# Patient Record
Sex: FEMALE | Race: WHITE | NOT HISPANIC OR LATINO | Employment: FULL TIME | URBAN - METROPOLITAN AREA
[De-identification: names, ages, dates, MRNs, and addresses within clinical notes are randomized per-mention and may not be internally consistent; named-entity substitution may affect disease eponyms.]

---

## 2021-09-28 ENCOUNTER — APPOINTMENT (OUTPATIENT)
Dept: GENERAL RADIOLOGY | Facility: CLINIC | Age: 56
End: 2021-09-28
Attending: FAMILY MEDICINE
Payer: COMMERCIAL

## 2021-09-28 ENCOUNTER — APPOINTMENT (OUTPATIENT)
Dept: CT IMAGING | Facility: CLINIC | Age: 56
End: 2021-09-28
Attending: FAMILY MEDICINE
Payer: COMMERCIAL

## 2021-09-28 ENCOUNTER — HOSPITAL ENCOUNTER (OUTPATIENT)
Facility: CLINIC | Age: 56
Setting detail: OBSERVATION
Discharge: HOME | End: 2021-09-29
Attending: FAMILY MEDICINE | Admitting: INTERNAL MEDICINE
Payer: COMMERCIAL

## 2021-09-28 DIAGNOSIS — D50.9 IRON DEFICIENCY ANEMIA, UNSPECIFIED IRON DEFICIENCY ANEMIA TYPE: ICD-10-CM

## 2021-09-28 DIAGNOSIS — D64.9 SEVERE ANEMIA: ICD-10-CM

## 2021-09-28 DIAGNOSIS — R60.0 EDEMA OF BOTH LEGS: ICD-10-CM

## 2021-09-28 DIAGNOSIS — Z11.52 ENCOUNTER FOR SCREENING LABORATORY TESTING FOR SEVERE ACUTE RESPIRATORY SYNDROME CORONAVIRUS 2 (SARS-COV-2): ICD-10-CM

## 2021-09-28 DIAGNOSIS — E87.6 HYPOKALEMIA: ICD-10-CM

## 2021-09-28 LAB
ABO/RH(D): NORMAL
ALBUMIN SERPL-MCNC: 2.7 G/DL (ref 3.4–5)
ALBUMIN SERPL-MCNC: NORMAL G/DL
ALBUMIN UR-MCNC: NEGATIVE MG/DL
ALP SERPL-CCNC: 70 U/L (ref 40–150)
ALP SERPL-CCNC: NORMAL U/L
ALT SERPL W P-5'-P-CCNC: 13 U/L (ref 0–50)
ALT SERPL W P-5'-P-CCNC: NORMAL U/L
ANION GAP SERPL CALCULATED.3IONS-SCNC: 6 MMOL/L (ref 3–14)
ANION GAP SERPL CALCULATED.3IONS-SCNC: NORMAL MMOL/L
ANTIBODY SCREEN: NEGATIVE
APPEARANCE UR: ABNORMAL
AST SERPL W P-5'-P-CCNC: 18 U/L (ref 0–45)
AST SERPL W P-5'-P-CCNC: NORMAL U/L
BACTERIA #/AREA URNS HPF: ABNORMAL /HPF
BASOPHILS # BLD AUTO: 0.1 10E3/UL (ref 0–0.2)
BASOPHILS # BLD MANUAL: 0.1 10E3/UL (ref 0–0.2)
BASOPHILS NFR BLD AUTO: 2 %
BASOPHILS NFR BLD MANUAL: 4 %
BILIRUB SERPL-MCNC: 0.4 MG/DL (ref 0.2–1.3)
BILIRUB SERPL-MCNC: NORMAL MG/DL
BILIRUB UR QL STRIP: NEGATIVE
BLD PROD TYP BPU: NORMAL
BLOOD COMPONENT TYPE: NORMAL
BUN SERPL-MCNC: 6 MG/DL (ref 7–30)
BUN SERPL-MCNC: NORMAL MG/DL
CALCIUM SERPL-MCNC: 7.7 MG/DL (ref 8.5–10.1)
CALCIUM SERPL-MCNC: NORMAL MG/DL
CHLORIDE BLD-SCNC: 115 MMOL/L (ref 94–109)
CHLORIDE BLD-SCNC: NORMAL MMOL/L
CO2 SERPL-SCNC: 21 MMOL/L (ref 20–32)
CO2 SERPL-SCNC: NORMAL MMOL/L
CODING SYSTEM: NORMAL
COLOR UR AUTO: YELLOW
CREAT SERPL-MCNC: 0.71 MG/DL (ref 0.52–1.04)
CREAT SERPL-MCNC: NORMAL MG/DL
CROSSMATCH: NORMAL
DACRYOCYTES BLD QL SMEAR: SLIGHT
ELLIPTOCYTES BLD QL SMEAR: SLIGHT
EOSINOPHIL # BLD AUTO: 0.1 10E3/UL (ref 0–0.7)
EOSINOPHIL # BLD MANUAL: 0.1 10E3/UL (ref 0–0.7)
EOSINOPHIL NFR BLD AUTO: 2 %
EOSINOPHIL NFR BLD MANUAL: 3 %
ERYTHROCYTE [DISTWIDTH] IN BLOOD BY AUTOMATED COUNT: 24.1 % (ref 10–15)
ERYTHROCYTE [DISTWIDTH] IN BLOOD BY AUTOMATED COUNT: 32.6 % (ref 10–15)
FERRITIN SERPL-MCNC: 1 NG/ML (ref 8–252)
FOLATE SERPL-MCNC: 41.6 NG/ML
GFR SERPL CREATININE-BSD FRML MDRD: >90 ML/MIN/1.73M2
GFR SERPL CREATININE-BSD FRML MDRD: NORMAL ML/MIN/{1.73_M2}
GLUCOSE BLD-MCNC: 86 MG/DL (ref 70–99)
GLUCOSE BLD-MCNC: NORMAL MG/DL
GLUCOSE UR STRIP-MCNC: NEGATIVE MG/DL
HCT VFR BLD AUTO: 11.9 % (ref 35–47)
HCT VFR BLD AUTO: 17 % (ref 35–47)
HEMOCCULT STL QL: NEGATIVE
HGB BLD-MCNC: 2.6 G/DL (ref 11.7–15.7)
HGB BLD-MCNC: 4.4 G/DL (ref 11.7–15.7)
HGB UR QL STRIP: NEGATIVE
HOLD SPECIMEN: NORMAL
IMM GRANULOCYTES # BLD: 0.1 10E3/UL
IMM GRANULOCYTES NFR BLD: 1 %
IRON SATN MFR SERPL: 3 % (ref 15–46)
IRON SERPL-MCNC: 13 UG/DL (ref 35–180)
ISSUE DATE AND TIME: NORMAL
KETONES UR STRIP-MCNC: NEGATIVE MG/DL
LDH SERPL L TO P-CCNC: 165 U/L (ref 81–234)
LEUKOCYTE ESTERASE UR QL STRIP: NEGATIVE
LYMPHOCYTES # BLD AUTO: 1.2 10E3/UL (ref 0.8–5.3)
LYMPHOCYTES # BLD MANUAL: 1 10E3/UL (ref 0.8–5.3)
LYMPHOCYTES NFR BLD AUTO: 30 %
LYMPHOCYTES NFR BLD MANUAL: 30 %
MCH RBC QN AUTO: 12.1 PG (ref 26.5–33)
MCH RBC QN AUTO: 16.4 PG (ref 26.5–33)
MCHC RBC AUTO-ENTMCNC: 21.8 G/DL (ref 31.5–36.5)
MCHC RBC AUTO-ENTMCNC: 25.9 G/DL (ref 31.5–36.5)
MCV RBC AUTO: 56 FL (ref 78–100)
MCV RBC AUTO: 63 FL (ref 78–100)
METAMYELOCYTES # BLD MANUAL: 0.1 10E3/UL
METAMYELOCYTES NFR BLD MANUAL: 3 %
MONOCYTES # BLD AUTO: 0.6 10E3/UL (ref 0–1.3)
MONOCYTES # BLD MANUAL: 0.1 10E3/UL (ref 0–1.3)
MONOCYTES NFR BLD AUTO: 15 %
MONOCYTES NFR BLD MANUAL: 4 %
MUCOUS THREADS #/AREA URNS LPF: PRESENT /LPF
MYELOCYTES # BLD MANUAL: 0 10E3/UL
MYELOCYTES NFR BLD MANUAL: 1 %
NEUTROPHILS # BLD AUTO: 1.9 10E3/UL (ref 1.6–8.3)
NEUTROPHILS # BLD MANUAL: 1.9 10E3/UL (ref 1.6–8.3)
NEUTROPHILS NFR BLD AUTO: 50 %
NEUTROPHILS NFR BLD MANUAL: 55 %
NITRATE UR QL: POSITIVE
NRBC # BLD AUTO: 0 10E3/UL
NRBC # BLD AUTO: 0.1 10E3/UL
NRBC BLD AUTO-RTO: 2 /100
NRBC BLD MANUAL-RTO: 1 %
NT-PROBNP SERPL-MCNC: 2364 PG/ML (ref 0–900)
PATH REPORT.COMMENTS IMP SPEC: NORMAL
PATH REPORT.COMMENTS IMP SPEC: NORMAL
PATH REPORT.FINAL DX SPEC: NORMAL
PATH REPORT.MICROSCOPIC SPEC OTHER STN: NORMAL
PATH REPORT.MICROSCOPIC SPEC OTHER STN: NORMAL
PATH REV: ABNORMAL
PH UR STRIP: 5 [PH] (ref 5–7)
PLAT MORPH BLD: ABNORMAL
PLATELET # BLD AUTO: 391 10E3/UL (ref 150–450)
PLATELET # BLD AUTO: 392 10E3/UL (ref 150–450)
POTASSIUM BLD-SCNC: 3.3 MMOL/L (ref 3.4–5.3)
POTASSIUM BLD-SCNC: NORMAL MMOL/L
PROT SERPL-MCNC: 5.7 G/DL (ref 6.8–8.8)
PROT SERPL-MCNC: NORMAL G/DL
RBC # BLD AUTO: 2.14 10E6/UL (ref 3.8–5.2)
RBC # BLD AUTO: 2.69 10E6/UL (ref 3.8–5.2)
RBC MORPH BLD: ABNORMAL
RBC URINE: 1 /HPF
RETICS # AUTO: 0.06 10E6/UL (ref 0.03–0.1)
RETICS/RBC NFR AUTO: 2.6 % (ref 0.5–2)
SARS-COV-2 RNA RESP QL NAA+PROBE: NEGATIVE
SODIUM SERPL-SCNC: 142 MMOL/L (ref 133–144)
SODIUM SERPL-SCNC: NORMAL MMOL/L
SP GR UR STRIP: 1.01 (ref 1–1.03)
SPECIMEN EXPIRATION DATE: NORMAL
SQUAMOUS EPITHELIAL: 3 /HPF
TIBC SERPL-MCNC: 452 UG/DL (ref 240–430)
TROPONIN I SERPL-MCNC: <0.015 UG/L (ref 0–0.04)
TSH SERPL DL<=0.005 MIU/L-ACNC: 2.28 MU/L (ref 0.4–4)
UNIT ABO/RH: NORMAL
UNIT NUMBER: NORMAL
UNIT STATUS: NORMAL
UNIT TYPE ISBT: 5100
UROBILINOGEN UR STRIP-MCNC: NORMAL MG/DL
WBC # BLD AUTO: 3.4 10E3/UL (ref 4–11)
WBC # BLD AUTO: 3.9 10E3/UL (ref 4–11)
WBC URINE: 4 /HPF

## 2021-09-28 PROCEDURE — 250N000011 HC RX IP 250 OP 636: Performed by: INTERNAL MEDICINE

## 2021-09-28 PROCEDURE — 93010 ELECTROCARDIOGRAM REPORT: CPT | Performed by: FAMILY MEDICINE

## 2021-09-28 PROCEDURE — 99285 EMERGENCY DEPT VISIT HI MDM: CPT | Mod: 25 | Performed by: FAMILY MEDICINE

## 2021-09-28 PROCEDURE — 96375 TX/PRO/DX INJ NEW DRUG ADDON: CPT | Mod: 59

## 2021-09-28 PROCEDURE — 82607 VITAMIN B-12: CPT | Performed by: FAMILY MEDICINE

## 2021-09-28 PROCEDURE — 250N000011 HC RX IP 250 OP 636: Performed by: FAMILY MEDICINE

## 2021-09-28 PROCEDURE — 36415 COLL VENOUS BLD VENIPUNCTURE: CPT | Mod: TC | Performed by: FAMILY MEDICINE

## 2021-09-28 PROCEDURE — 85045 AUTOMATED RETICULOCYTE COUNT: CPT | Performed by: FAMILY MEDICINE

## 2021-09-28 PROCEDURE — 250N000009 HC RX 250: Performed by: FAMILY MEDICINE

## 2021-09-28 PROCEDURE — C9803 HOPD COVID-19 SPEC COLLECT: HCPCS | Performed by: FAMILY MEDICINE

## 2021-09-28 PROCEDURE — 250N000013 HC RX MED GY IP 250 OP 250 PS 637: Performed by: INTERNAL MEDICINE

## 2021-09-28 PROCEDURE — 93005 ELECTROCARDIOGRAM TRACING: CPT | Performed by: FAMILY MEDICINE

## 2021-09-28 PROCEDURE — 82728 ASSAY OF FERRITIN: CPT | Performed by: FAMILY MEDICINE

## 2021-09-28 PROCEDURE — 83880 ASSAY OF NATRIURETIC PEPTIDE: CPT | Performed by: FAMILY MEDICINE

## 2021-09-28 PROCEDURE — P9016 RBC LEUKOCYTES REDUCED: HCPCS | Performed by: FAMILY MEDICINE

## 2021-09-28 PROCEDURE — 84484 ASSAY OF TROPONIN QUANT: CPT | Performed by: FAMILY MEDICINE

## 2021-09-28 PROCEDURE — 83550 IRON BINDING TEST: CPT | Performed by: FAMILY MEDICINE

## 2021-09-28 PROCEDURE — 81001 URINALYSIS AUTO W/SCOPE: CPT | Performed by: FAMILY MEDICINE

## 2021-09-28 PROCEDURE — 86901 BLOOD TYPING SEROLOGIC RH(D): CPT | Performed by: FAMILY MEDICINE

## 2021-09-28 PROCEDURE — 36415 COLL VENOUS BLD VENIPUNCTURE: CPT | Performed by: FAMILY MEDICINE

## 2021-09-28 PROCEDURE — 36415 COLL VENOUS BLD VENIPUNCTURE: CPT | Performed by: INTERNAL MEDICINE

## 2021-09-28 PROCEDURE — 96374 THER/PROPH/DIAG INJ IV PUSH: CPT | Mod: 59

## 2021-09-28 PROCEDURE — 87086 URINE CULTURE/COLONY COUNT: CPT | Performed by: FAMILY MEDICINE

## 2021-09-28 PROCEDURE — 86923 COMPATIBILITY TEST ELECTRIC: CPT | Performed by: INTERNAL MEDICINE

## 2021-09-28 PROCEDURE — 74177 CT ABD & PELVIS W/CONTRAST: CPT

## 2021-09-28 PROCEDURE — 82272 OCCULT BLD FECES 1-3 TESTS: CPT | Performed by: FAMILY MEDICINE

## 2021-09-28 PROCEDURE — 82746 ASSAY OF FOLIC ACID SERUM: CPT | Performed by: FAMILY MEDICINE

## 2021-09-28 PROCEDURE — 87635 SARS-COV-2 COVID-19 AMP PRB: CPT | Performed by: FAMILY MEDICINE

## 2021-09-28 PROCEDURE — 120N000001 HC R&B MED SURG/OB

## 2021-09-28 PROCEDURE — 82040 ASSAY OF SERUM ALBUMIN: CPT | Performed by: FAMILY MEDICINE

## 2021-09-28 PROCEDURE — C9113 INJ PANTOPRAZOLE SODIUM, VIA: HCPCS | Performed by: INTERNAL MEDICINE

## 2021-09-28 PROCEDURE — 36430 TRANSFUSION BLD/BLD COMPNT: CPT | Performed by: FAMILY MEDICINE

## 2021-09-28 PROCEDURE — 83615 LACTATE (LD) (LDH) ENZYME: CPT | Performed by: FAMILY MEDICINE

## 2021-09-28 PROCEDURE — 85027 COMPLETE CBC AUTOMATED: CPT | Performed by: INTERNAL MEDICINE

## 2021-09-28 PROCEDURE — 85025 COMPLETE CBC W/AUTO DIFF WBC: CPT | Performed by: FAMILY MEDICINE

## 2021-09-28 PROCEDURE — 85060 BLOOD SMEAR INTERPRETATION: CPT | Performed by: PATHOLOGY

## 2021-09-28 PROCEDURE — 84443 ASSAY THYROID STIM HORMONE: CPT | Performed by: FAMILY MEDICINE

## 2021-09-28 PROCEDURE — 99223 1ST HOSP IP/OBS HIGH 75: CPT | Mod: AI | Performed by: INTERNAL MEDICINE

## 2021-09-28 PROCEDURE — 71046 X-RAY EXAM CHEST 2 VIEWS: CPT

## 2021-09-28 PROCEDURE — 86923 COMPATIBILITY TEST ELECTRIC: CPT | Performed by: FAMILY MEDICINE

## 2021-09-28 RX ORDER — PROCHLORPERAZINE MALEATE 5 MG
10 TABLET ORAL EVERY 6 HOURS PRN
Status: DISCONTINUED | OUTPATIENT
Start: 2021-09-28 | End: 2021-09-29 | Stop reason: HOSPADM

## 2021-09-28 RX ORDER — POTASSIUM CHLORIDE 1.5 G/1.58G
20 POWDER, FOR SOLUTION ORAL ONCE
Status: COMPLETED | OUTPATIENT
Start: 2021-09-28 | End: 2021-09-28

## 2021-09-28 RX ORDER — ONDANSETRON 4 MG/1
4 TABLET, ORALLY DISINTEGRATING ORAL EVERY 6 HOURS PRN
Status: DISCONTINUED | OUTPATIENT
Start: 2021-09-28 | End: 2021-09-29 | Stop reason: HOSPADM

## 2021-09-28 RX ORDER — IOPAMIDOL 755 MG/ML
51 INJECTION, SOLUTION INTRAVASCULAR ONCE
Status: DISCONTINUED | OUTPATIENT
Start: 2021-09-28 | End: 2021-09-29 | Stop reason: HOSPADM

## 2021-09-28 RX ORDER — ONDANSETRON 2 MG/ML
4 INJECTION INTRAMUSCULAR; INTRAVENOUS EVERY 6 HOURS PRN
Status: DISCONTINUED | OUTPATIENT
Start: 2021-09-28 | End: 2021-09-29 | Stop reason: HOSPADM

## 2021-09-28 RX ORDER — AMOXICILLIN 250 MG
2 CAPSULE ORAL 2 TIMES DAILY PRN
Status: DISCONTINUED | OUTPATIENT
Start: 2021-09-28 | End: 2021-09-29 | Stop reason: HOSPADM

## 2021-09-28 RX ORDER — LIDOCAINE 40 MG/G
CREAM TOPICAL
Status: DISCONTINUED | OUTPATIENT
Start: 2021-09-28 | End: 2021-09-29 | Stop reason: HOSPADM

## 2021-09-28 RX ORDER — FUROSEMIDE 10 MG/ML
20 INJECTION INTRAMUSCULAR; INTRAVENOUS EVERY 8 HOURS
Status: DISCONTINUED | OUTPATIENT
Start: 2021-09-28 | End: 2021-09-29

## 2021-09-28 RX ORDER — ACETAMINOPHEN 650 MG/1
650 SUPPOSITORY RECTAL EVERY 6 HOURS PRN
Status: DISCONTINUED | OUTPATIENT
Start: 2021-09-28 | End: 2021-09-29 | Stop reason: HOSPADM

## 2021-09-28 RX ORDER — AMOXICILLIN 250 MG
1 CAPSULE ORAL 2 TIMES DAILY PRN
Status: DISCONTINUED | OUTPATIENT
Start: 2021-09-28 | End: 2021-09-29 | Stop reason: HOSPADM

## 2021-09-28 RX ORDER — ACETAMINOPHEN 325 MG/1
650 TABLET ORAL EVERY 6 HOURS PRN
Status: DISCONTINUED | OUTPATIENT
Start: 2021-09-28 | End: 2021-09-29 | Stop reason: HOSPADM

## 2021-09-28 RX ORDER — PROCHLORPERAZINE 25 MG
25 SUPPOSITORY, RECTAL RECTAL EVERY 12 HOURS PRN
Status: DISCONTINUED | OUTPATIENT
Start: 2021-09-28 | End: 2021-09-29 | Stop reason: HOSPADM

## 2021-09-28 RX ORDER — BISACODYL 10 MG
10 SUPPOSITORY, RECTAL RECTAL DAILY PRN
Status: DISCONTINUED | OUTPATIENT
Start: 2021-09-28 | End: 2021-09-29 | Stop reason: HOSPADM

## 2021-09-28 RX ORDER — IOPAMIDOL 755 MG/ML
52 INJECTION, SOLUTION INTRAVASCULAR ONCE
Status: COMPLETED | OUTPATIENT
Start: 2021-09-28 | End: 2021-09-28

## 2021-09-28 RX ORDER — DIPHENHYDRAMINE HCL 25 MG
25 TABLET ORAL EVERY MORNING
COMMUNITY

## 2021-09-28 RX ORDER — POLYETHYLENE GLYCOL 3350 17 G/17G
17 POWDER, FOR SOLUTION ORAL DAILY PRN
Status: DISCONTINUED | OUTPATIENT
Start: 2021-09-28 | End: 2021-09-29 | Stop reason: HOSPADM

## 2021-09-28 RX ADMIN — SODIUM CHLORIDE 53 ML: 9 INJECTION, SOLUTION INTRAVENOUS at 17:38

## 2021-09-28 RX ADMIN — POTASSIUM CHLORIDE 20 MEQ: 1.5 FOR SOLUTION ORAL at 21:36

## 2021-09-28 RX ADMIN — FUROSEMIDE 20 MG: 10 INJECTION, SOLUTION INTRAMUSCULAR; INTRAVENOUS at 21:36

## 2021-09-28 RX ADMIN — IOPAMIDOL 52 ML: 755 INJECTION, SOLUTION INTRAVENOUS at 17:38

## 2021-09-28 RX ADMIN — PANTOPRAZOLE SODIUM 40 MG: 40 INJECTION, POWDER, FOR SOLUTION INTRAVENOUS at 21:36

## 2021-09-28 ASSESSMENT — ACTIVITIES OF DAILY LIVING (ADL)
DRESSING/BATHING_DIFFICULTY: NO
HEARING_DIFFICULTY_OR_DEAF: NO
DOING_ERRANDS_INDEPENDENTLY_DIFFICULTY: NO
CONCENTRATING,_REMEMBERING_OR_MAKING_DECISIONS_DIFFICULTY: NO
TOILETING_ISSUES: NO
WALKING_OR_CLIMBING_STAIRS_DIFFICULTY: NO
WEAR_GLASSES_OR_BLIND: NO
FALL_HISTORY_WITHIN_LAST_SIX_MONTHS: NO
DIFFICULTY_COMMUNICATING: NO
DIFFICULTY_EATING/SWALLOWING: NO

## 2021-09-28 ASSESSMENT — ENCOUNTER SYMPTOMS
DIARRHEA: 0
DYSURIA: 0
DIAPHORESIS: 0
FEVER: 0
WHEEZING: 0
VOMITING: 0
PALPITATIONS: 0
HEADACHES: 0
NAUSEA: 0
BLOOD IN STOOL: 0
FREQUENCY: 0
SINUS PRESSURE: 0
CHILLS: 0
CONSTIPATION: 1
COUGH: 0
SORE THROAT: 0
ABDOMINAL PAIN: 0
SHORTNESS OF BREATH: 1

## 2021-09-28 ASSESSMENT — MIFFLIN-ST. JEOR
SCORE: 1065.88
SCORE: 1062.16

## 2021-09-28 NOTE — ED PROVIDER NOTES
"  History     Chief Complaint   Patient presents with     Leg Swelling     BLE edema x 2 weeks.  \"all the way up to my crotch\".  pt has not seen a medical provider \"in 30 years\"     HPI  Ayse Velez is a 56 year old female who presents as a stranger to medical care for the last 30 years, history of possible gout in the past, uses tobacco approximately half a pack over the course of a few days of tobacco, 1 beer a day.  Prior increased use about 3 months ago but that is since then decreased.  Accompanied by her daughter.  They have been concerned about increased symmetric bilateral lower extremity edema that is increased from the thighs into the lower legs.  He has not yet been seen for this.  She has tried a over-the-counter diuretic but is unclear what that ones.  She had no prescriptions for any medications.  She has no known liver or renal disease.  She has no history of known thyroid disease.  No known cardiopulmonary disease.  She does have a sense of fatigue on exertion possibly dyspnea on exertion.  She does have to stop and rest as she walks but unclear on distance.  No obvious orthopnea.  No chest pain.  No rashes.  She has constipation chronically.  She stools a large amount approximately once every 1 to 2 weeks but in between puts out small stools.  No significant abdominal pain or distention.      Allergies:  Allergies   Allergen Reactions     Erythromycin Nausea and Vomiting       Problem List:    There are no problems to display for this patient.       Past Medical History:    No past medical history on file.    Past Surgical History:    Past Surgical History:   Procedure Laterality Date     SURGICAL HISTORY OF -   3/19/1981    Transverse Low Cervical   (breech)       Family History:    No family history on file.    Social History:  Marital Status:   [2]  Social History     Tobacco Use     Smoking status: Not on file   Substance Use Topics     Alcohol use: Not on file     Drug use: " "Not on file        Medications:    No current outpatient medications on file.        Review of Systems   Constitutional: Negative for chills, diaphoresis and fever.   HENT: Negative for ear pain, sinus pressure and sore throat.    Eyes: Negative for visual disturbance.   Respiratory: Positive for shortness of breath. Negative for cough and wheezing.    Cardiovascular: Positive for leg swelling. Negative for chest pain and palpitations.   Gastrointestinal: Positive for constipation. Negative for abdominal pain, blood in stool, diarrhea, nausea and vomiting.   Genitourinary: Negative for dysuria, frequency and urgency.   Skin: Negative for rash.   Neurological: Negative for headaches.   All other systems reviewed and are negative.      Physical Exam   BP: 126/63  Pulse: 105  Temp: 98.6  F (37  C)  Resp: 18  Height: 165.1 cm (5' 5\")  Weight: 47.6 kg (105 lb)  SpO2: 100 %      Physical Exam  Constitutional:       General: She is in acute distress.   HENT:      Head: Atraumatic.   Eyes:      Conjunctiva/sclera: Conjunctivae normal.   Cardiovascular:      Rate and Rhythm: Normal rate and regular rhythm.      Heart sounds: No murmur heard.     Pulmonary:      Effort: Pulmonary effort is normal. No respiratory distress.      Breath sounds: Normal breath sounds. No stridor. No wheezing or rhonchi.   Abdominal:      General: Abdomen is flat. There is no distension.      Palpations: There is no mass.      Tenderness: There is no abdominal tenderness. There is no guarding.   Musculoskeletal:      Cervical back: Neck supple.      Right lower leg: Edema present.      Left lower leg: Edema present.   Skin:     Coloration: Skin is pale.      Findings: No rash.   Neurological:      General: No focal deficit present.      Mental Status: She is alert.      Motor: No weakness.     pitting lower extremity edema   She did not let me press significantly due to discomfort with palpation      ED Course        Procedures                EKG " Interpretation:      Interpreted by Kunal Long MD  EKG done at 1325 hrs. demonstrates a sinus rhythm 106 bpm normal axis.  There is no ST change.  There is poor R progression V1 through V3.  No Q waves.  Normal intervals.  Normal conduction.  No ectopy.  Impression sinus rhythm 106 bpm.  There may be the very slightest ST depression in lead V4 and V5.  It is very minimal.  No old EKG to compare.    Critical Care time:  none               Results for orders placed or performed during the hospital encounter of 09/28/21 (from the past 24 hour(s))   UA with Microscopic reflex to Culture    Specimen: Urine, Clean Catch   Result Value Ref Range    Color Urine Yellow Colorless, Straw, Light Yellow, Yellow    Appearance Urine Slightly Cloudy (A) Clear    Glucose Urine Negative Negative mg/dL    Bilirubin Urine Negative Negative    Ketones Urine Negative Negative mg/dL    Specific Gravity Urine 1.011 1.003 - 1.035    Blood Urine Negative Negative    pH Urine 5.0 5.0 - 7.0    Protein Albumin Urine Negative Negative mg/dL    Urobilinogen Urine Normal Normal, 2.0 mg/dL    Nitrite Urine Positive (A) Negative    Leukocyte Esterase Urine Negative Negative    Bacteria Urine Few (A) None Seen /HPF    Mucus Urine Present (A) None Seen /LPF    RBC Urine 1 <=2 /HPF    WBC Urine 4 <=5 /HPF    Squamous Epithelials Urine 3 (H) <=1 /HPF    Narrative    Urine Culture ordered based on laboratory criteria   Comprehensive metabolic panel   Result Value Ref Range    Sodium      Potassium      Chloride      Carbon Dioxide (CO2)      Anion Gap      Urea Nitrogen      Creatinine      Calcium      Glucose      Alkaline Phosphatase      AST      ALT      Protein Total      Albumin      Bilirubin Total      GFR Estimate     [place] Draw    Narrative    The following orders were created for panel order [place] Draw.  Procedure                               Abnormality         Status                     ---------                                -----------         ------                     Extra Green Top (Lithium...[[id number]]                      Final result                 Please view results for these tests on the individual orders.   Extra Green Top (Lithium Heparin) Tube   Result Value Ref Range    Hold Specimen JIC    XR Chest 2 Views    Narrative    XR CHEST 2 VW  9/28/2021 3:15 PM       INDICATION: leg edema  COMPARISON: None       Impression    IMPRESSION: Cardiomegaly. Pulmonary vascularity is within normal  limits. There are small bilateral pleural effusions. Mild infiltrate  or atelectasis in the right lower lobe.    JAYLEN DENNEY MD         SYSTEM ID:  [id number]   CBC with platelets differential    Narrative    The following orders were created for panel order CBC with platelets differential.  Procedure                               Abnormality         Status                     ---------                               -----------         ------                     CBC with platelets and d...[[id number]]  Abnormal            Final result               Manual Differential[[id number]]          Abnormal            Final result                 Please view results for these tests on the individual orders.   TSH with free T4 reflex   Result Value Ref Range    TSH 2.28 0.40 - 4.00 mU/L   Nt probnp inpatient (BNP)   Result Value Ref Range    N terminal Pro BNP Inpatient 2,364 (H) 0 - 900 pg/mL   Troponin I   Result Value Ref Range    Troponin I <0.015 0.000 - 0.045 ug/L   CBC with platelets and differential   Result Value Ref Range    WBC Count 3.4 (L) 4.0 - 11.0 10e3/uL    RBC Count 2.14 (L) 3.80 - 5.20 10e6/uL    Hemoglobin 2.6 (LL) 11.7 - 15.7 g/dL    Hematocrit 11.9 (L) 35.0 - 47.0 %    MCV 56 (L) 78 - 100 fL    MCH 12.1 (L) 26.5 - 33.0 pg    MCHC 21.8 (L) 31.5 - 36.5 g/dL    RDW 24.1 (H) 10.0 - 15.0 %    Platelet Count 391 150 - 450 10e3/uL   ABO/Rh type and screen    Narrative    The following orders were created for panel order ABO/Rh type and  screen.  Procedure                               Abnormality         Status                     ---------                               -----------         ------                     Adult Type and Screen[[id number]]                            Final result                 Please view results for these tests on the individual orders.   Adult Type and Screen   Result Value Ref Range    ABO/RH(D) O POS     Antibody Screen Negative Negative    SPECIMEN EXPIRATION DATE [id number]    Comprehensive metabolic panel   Result Value Ref Range    Sodium 142 133 - 144 mmol/L    Potassium 3.3 (L) 3.4 - 5.3 mmol/L    Chloride 115 (H) 94 - 109 mmol/L    Carbon Dioxide (CO2) 21 20 - 32 mmol/L    Anion Gap 6 3 - 14 mmol/L    Urea Nitrogen 6 (L) 7 - 30 mg/dL    Creatinine 0.71 0.52 - 1.04 mg/dL    Calcium 7.7 (L) 8.5 - 10.1 mg/dL    Glucose 86 70 - 99 mg/dL    Alkaline Phosphatase 70 40 - 150 U/L    AST 18 0 - 45 U/L    ALT 13 0 - 50 U/L    Protein Total 5.7 (L) 6.8 - 8.8 g/dL    Albumin 2.7 (L) 3.4 - 5.0 g/dL    Bilirubin Total 0.4 0.2 - 1.3 mg/dL    GFR Estimate >90 >60 mL/min/1.73m2   Ferritin   Result Value Ref Range    Ferritin 1 (L) 8 - 252 ng/mL   Iron and iron binding capacity   Result Value Ref Range    Iron 13 (L) 35 - 180 ug/dL    Iron Binding Capacity 452 (H) 240 - 430 ug/dL    Iron Sat Index 3 (L) 15 - 46 %   Manual Differential   Result Value Ref Range    % Neutrophils 55 %    % Lymphocytes 30 %    % Monocytes 4 %    % Eosinophils 3 %    % Basophils 4 %    % Metamyelocytes 3 %    % Myelocytes 1 %    NRBCs per 100 WBC 1 (H) <=0 %    Absolute Neutrophils 1.9 1.6 - 8.3 10e3/uL    Absolute Lymphocytes 1.0 0.8 - 5.3 10e3/uL    Absolute Monocytes 0.1 0.0 - 1.3 10e3/uL    Absolute Eosinophils 0.1 0.0 - 0.7 10e3/uL    Absolute Basophils 0.1 0.0 - 0.2 10e3/uL    Absolute Metamyelocytes 0.1 (H) <=0.0 10e3/uL    Absolute Myelocytes 0.0 <=0.0 10e3/uL    Absolute NRBCs 0.0 <=0.0 10e3/uL    RBC Morphology Confirmed RBC Indices      Platelet Assessment  Automated Count Confirmed. Platelet morphology is normal.     Automated Count Confirmed. Platelet morphology is normal.    Elliptocytes Slight (A) None Seen    Teardrop Cells Slight (A) None Seen    Pathologist Review Comments      Narrative    Sent for Review by Pathologist. Review comments will be entered. Results will be updated after review as applicable.     Lactate Dehydrogenase   Result Value Ref Range    Lactate Dehydrogenase 165 81 - 234 U/L   Lab Blood Morphology Pathologist Review    Narrative    The following orders were created for panel order Lab Blood Morphology Pathologist Review.  Procedure                               Abnormality         Status                     ---------                               -----------         ------                     Bld morphology pathology...[[id number]]                      Final result               CBC with platelets and d...[[id number]]                                                 Reticulocyte count[[id number]]                                                          Morphology Tracking[[id number]]                              Final result                 Please view results for these tests on the individual orders.   Reticulocyte count   Result Value Ref Range    % Reticulocyte 2.6 (H) 0.5 - 2.0 %    Absolute Reticulocyte 0.055 0.025 - 0.095 10e6/uL   Bld morphology pathology review   Result Value Ref Range    Final Diagnosis       A.  Peripheral blood smear for morphology:  -  Marked microcytic, hypochromic anemia without morphologic evidence of hemolysis or increased red cell regeneration.  -  Mild leukopenia without an absolute decrease of any specific cell type      Comment       The findings are highly suspect for iron deficiency related anemia.  Full iron studies are recommended and if a deficiency of iron is confirmed additional clinical correlation should follow to determine if the deficiency is related to increased iron loss from  occult bleeding, small bowel disease with decreased iron absorption, or decreased dietary iron consumption.        Peripheral Smear       PERIPHERAL BLOOD DIFFERENTIAL:    The automated differential associated with the CBC for this case was imported from [facility] and was confirmed accurate with a 200 cell count manual differential review of the smear prepared from a blood sample collected 9/28/2021 at 1551.    PERIPHERAL BLOOD MORPHOLOGY:    ERYTHROCYTES: The red cells are microcytic hypochromic and markedly decreased in number for the patient's age and gender with a resulting mild anemia.  Moderate anisopoikilocytosis is present consisting of increased ovalocytes and elliptocytes in addition to the microcytic cell population. No features of hemolysis or increased polychromasia are identified.  No parasites are seen.    LEUKOCYTES:  The leukocytes are morphologically normal and mildly decreased in number. No immature precursors or evidence of neutrophilic dysplasia is seen. No atypical lymphoid cells are seen. No parasites or parasitic inclusions are seen.           PLATELETS:  The platelets are normal in number and morphology.      Peripheral Hematologic Data       Results for MADLEINE MARS (MRN [id number])    Ref. Range 9/28/2021 15:51   WBC Latest Ref Range: 4.0 - 11.0 10e3/uL 3.4 (L)   Hemoglobin Latest Ref Range: 11.7 - 15.7 g/dL 2.6 (LL)   Hematocrit Latest Ref Range: 35.0 - 47.0 % 11.9 (L)   Platelet Count Latest Ref Range: 150 - 450 10e3/uL 391   RBC Count Latest Ref Range: 3.80 - 5.20 10e6/uL 2.14 (L)   MCV Latest Ref Range: 78 - 100 fL 56 (L)   MCH Latest Ref Range: 26.5 - 33.0 pg 12.1 (L)   MCHC Latest Ref Range: 31.5 - 36.5 g/dL 21.8 (L)   RDW Latest Ref Range: 10.0 - 15.0 % 24.1 (H)   % Neutrophils Latest Units: % 55   % Lymphocytes Latest Units: % 30   % Monocytes Latest Units: % 4   % Eosinophils Latest Units: % 3   Absolute Basophils Latest Ref Range: 0.0 - 0.2 10e3/uL 0.1   % Basophils Latest Units:  % 4   % Metamyelocytes Latest Units: % 3   % Myelocytes Latest Units: % 1   NRBC/W Latest Ref Range: <=0 % 1 (H)   Absolute Neutrophil Latest Ref Range: 1.6 - 8.3 10e3/uL 1.9   Absolute Lymphocytes Latest Ref Range: 0.8 - 5.3 10e3/uL 1.0   Absolute Monocytes Latest Ref Range: 0.0 - 1.3 10e3/uL 0.1   Absolute Eosinophils Latest Ref Range: 0.0 - 0.7 10e3/uL 0.1   Absolute Metamyelocytes Latest Ref Range: <=0.0 10e3/uL 0.1 (H)   Absolute Myelocytes Latest Ref Range: <=0.0 10e3/uL 0.0   Absolute NRBCs Latest Ref Range: <=0.0 10e3/uL 0.0   RBC Morphology Unknown Confirmed RBC Indices   Platelet Morphology Latest Ref Range: Automated Count Confirmed. Platelet morphology is normal.  Automated Count Confirmed. Platelet morphology is normal.   Teardrop Cells Latest Ref Range: None Seen  Slight (A)   Elliptocytes Latest Ref Range: None Seen  Slight (A)   % Retic Latest Ref Range: 0.5 - 2.0 % 2.6 (H)   Absolute Retic Latest Ref Range: 0.025 - 0.095 10e6/uL 0.055         Performing Labs       The technical component of this testing was completed at [facility], [facility] and [facility]     Occult blood stool   Result Value Ref Range    Occult Blood Negative Negative   Prepare red blood cells (unit)   Result Value Ref Range    CROSSMATCH Compatible     UNIT ABO/RH O Pos     Unit Number [id number]     Unit Status Issued     Blood Component Type Red Blood Cells     Product Code Q2283W00     CODING SYSTEM [id number]     UNIT TYPE ISBT 5100     ISSUE DATE AND TIME [id number]    Prepare red blood cells (unit)   Result Value Ref Range    CROSSMATCH Compatible     UNIT ABO/RH O Pos     Unit Number [id number]     Unit Status Issued     Blood Component Type Red Blood Cells     Product Code S5752N74     CODING SYSTEM [id number]     UNIT TYPE ISBT 5100     ISSUE DATE AND TIME [id number]    Prepare red blood cells (unit)   Result Value Ref  Range    CROSSMATCH Compatible     UNIT ABO/RH O Pos     Unit Number [id number]     Unit Status Ready     Blood Component Type Red Blood Cells     Product Code [id number]     CODING SYSTEM [id number]     UNIT TYPE ISBT 5100    Prepare red blood cells (unit)   Result Value Ref Range    CROSSMATCH Compatible     UNIT ABO/RH O Pos     Unit Number [id number]     Unit Status Ready     Blood Component Type Red Blood Cells     Product Code [id number]     CODING SYSTEM [id number]     UNIT TYPE ISBT 5100    Asymptomatic COVID-19 Virus (Coronavirus) by PCR Nasopharyngeal    Specimen: Nasopharyngeal; Swab   Result Value Ref Range    SARS CoV2 PCR Negative Negative    Narrative    Testing was performed using the manuel  SARS-CoV-2 & Influenza A/B Assay on the manuel  Suzie  System.  This test should be ordered for the detection of SARS-COV-2 in individuals who meet SARS-CoV-2 clinical and/or epidemiological criteria. Test performance is unknown in asymptomatic patients.  This test is for in vitro diagnostic use under the FDA EUA for laboratories certified under CLIA to perform moderate and/or high complexity testing. This test has not been FDA cleared or approved.  A negative test does not rule out the presence of PCR inhibitors in the specimen or target RNA in concentration below the limit of detection for the assay. The possibility of a false negative should be considered if the patient's recent exposure or clinical presentation suggests COVID-19.  [facility] are certified under the Clinical Laboratory Improvement Amendments of 1988 (CLIA-88) as qualified to perform moderate and/or high complexity laboratory testing.   CT Chest/Abdomen/Pelvis w Contrast    Narrative    EXAM: CT CHEST/ABDOMEN/PELVIS W CONTRAST  LOCATION: [facility]  DATE/TIME: 9/28/2021 5:26 PM    INDICATION: Severe anemia. Bilateral lower extremity edema. Suspected underlying malignancy.  COMPARISON: None.  TECHNIQUE:  CT scan of the chest, abdomen, and pelvis was performed following injection of IV contrast. Multiplanar reformats were obtained. Dose reduction techniques were used.   CONTRAST: 52 mL Isovue 370    FINDINGS:   LUNGS AND PLEURA: There are bilateral pleural effusions, small to moderate on the right and small on the left. Mild associated atelectasis at both lung bases. Smooth interlobular septal thickening in both lower lungs suggests pulmonary edema. There are   mild emphysematous changes noted in both upper lungs.    MEDIASTINUM/AXILLAE: Borderline enlarged precarinal lymph node in the mediastinum measures 1.8 x 1 cm. No other enlarged lymph nodes are identified in the chest. No pericardial effusion. Cardiac enlargement.    CORONARY ARTERY CALCIFICATION: Mild.    HEPATOBILIARY: Normal.    PANCREAS: Normal.    SPLEEN: Normal.    ADRENAL GLANDS: Normal.    KIDNEYS/BLADDER: No significant mass, stone, or hydronephrosis.    BOWEL: Diverticulosis of the colon. No acute inflammatory change. No obstruction.     LYMPH NODES: No lymphadenopathy.    VASCULATURE: Mild atherosclerotic aortoiliac calcification.    PELVIC ORGANS: Small amount of free fluid in the pelvis.    MUSCULOSKELETAL: Mild subcutaneous edema about the abdomen and pelvis.      Impression    IMPRESSION:  1.  Bilateral pleural effusions, small to moderate on the right and small on the left.  2.  Smooth interlobular septal thickening in both lower lungs is likely related to pulmonary edema.  3.  Mild subcutaneous edema is noted about the abdomen and pelvis.  4.  Small amount of nonspecific free fluid in the pelvis.  5.  Cardiac enlargement.  6.  Emphysema.   CBC with platelets differential *Canceled*    Narrative    The following orders were created for panel order CBC with platelets differential.  Procedure                               Abnormality         Status                     ---------                               -----------         ------                        Please view results for these tests on the individual orders.   CBC with platelets differential    Narrative    The following orders were created for panel order CBC with platelets differential.  Procedure                               Abnormality         Status                     ---------                               -----------         ------                     CBC with platelets and d...[[id number]]  Abnormal            Final result                 Please view results for these tests on the individual orders.   CBC with platelets and differential   Result Value Ref Range    WBC Count 3.9 (L) 4.0 - 11.0 10e3/uL    RBC Count 2.69 (L) 3.80 - 5.20 10e6/uL    Hemoglobin 4.4 (LL) 11.7 - 15.7 g/dL    Hematocrit 17.0 (L) 35.0 - 47.0 %    MCV 63 (L) 78 - 100 fL    MCH 16.4 (L) 26.5 - 33.0 pg    MCHC 25.9 (L) 31.5 - 36.5 g/dL    RDW 32.6 (H) 10.0 - 15.0 %    Platelet Count 392 150 - 450 10e3/uL    % Neutrophils 50 %    % Lymphocytes 30 %    % Monocytes 15 %    % Eosinophils 2 %    % Basophils 2 %    % Immature Granulocytes 1 %    NRBCs per 100 WBC 2 (H) <1 /100    Absolute Neutrophils 1.9 1.6 - 8.3 10e3/uL    Absolute Lymphocytes 1.2 0.8 - 5.3 10e3/uL    Absolute Monocytes 0.6 0.0 - 1.3 10e3/uL    Absolute Eosinophils 0.1 0.0 - 0.7 10e3/uL    Absolute Basophils 0.1 0.0 - 0.2 10e3/uL    Absolute Immature Granulocytes 0.1 (H) <=0.0 10e3/uL    Absolute NRBCs 0.1 10e3/uL       Medications - No data to display    Assessments & Plan (with Medical Decision Making)     MDM:  Ayse Velez is a 56 year old female who presents with lower extremity edema bilaterally pallor, dyspnea, stranger to medical care.  Has not had any of her preventive care.  She has never had a colonoscopy, she has no history of mammograms breast evaluations Pap smear.  No immunizations.  Recommended that we start with CBC comprehensive panel BNP chest x-ray urinalysis thyroid testing to evaluate for bilateral symmetric lower extremity edema.   We discussed that we are likely to uncover some abnormalities and she will need follow-up.  We will plan for primary care referral.  We discussed potential risk that there may be some significant abnormality such as renal dysfunction or cardiomyopathy  that may require hospitalization.,,  And she is very reluctant to do so at this point      The patient's labs including hemoglobin were significantly delayed due to confirmation.  When they were confirmed the hemoglobin was between 2 and 3.  This is remarkable as the patient's systolic blood pressure was in 130s and heart rate was in the 60s to 80 range.  Although pale she remained hemodynamically stable and relatively asymptomatic at rest.  We did discuss consent for blood products.  Discussed the risk and benefits.  Very slow transfusion was initiated with the first unit given over approximately 3 hours.  Intended continue slow transfusions.  There is a risk for fluid overload for her and she does have findings of congestive heart failure with cardiomegaly pleural effusions which I suspect is due to an osmotic gradient with no blood little protein resulting in third spacing of fluid.   I suspect this anemia has been very longstanding with the way she is compensated.     I did pursue evaluation for secondary causes after discussion with Leon Esteban MD regarding obtaining CT of the abdomen pelvis as well as chest for evaluation of occult malignancy.  This imaging is relatively reassuring in this regard.  Blood is also been sent for ferritin and TIBC.  Indices were not initially known and therefore B12 was also obtained as well as folic acid.  Blood was sent for LDH as well as for peripheral smear reticulocyte count    Her rectal exam reveals no significant material out.  No obvious blood loss that I could identify.    I discussed at length with the patient and her daughter related to findings.  She agrees to be admitted to the hospital.  I have reviewed the nursing  notes.      I have reviewed the findings, diagnosis, plan and need for follow up with the patient.       ED to Inpatient Handoff:    Discussed with Dr. Esteban  Patient accepted for Inpatient Stay  Pending studies include CT chest/abd/pelvis  Code Status: Not Addressed           New Prescriptions    No medications on file       Final diagnoses:   Severe anemia   Edema of both legs   Hypokalemia   Iron deficiency anemia, unspecified iron deficiency anemia type       9/28/2021   [facility] EMERGENCY DEPT     Kunal Long MD  09/28/21 [id number]

## 2021-09-28 NOTE — LETTER
9/29/2021     RE:  Ayse Velez                                                                                                                                                       [address] [place] [id number]            To whom it may concern:    Ayse Velez has been hospitalized under my professional care from 9/28/2021 to 9/29/2021. I have recommended that she not return to work until Monday, Oct 4, 2021, at which time she may return to work without restriction.       Sincerely,        Mahin Perez MD   [facility]

## 2021-09-29 ENCOUNTER — APPOINTMENT (OUTPATIENT)
Dept: CARDIOLOGY | Facility: CLINIC | Age: 56
End: 2021-09-29
Attending: INTERNAL MEDICINE
Payer: COMMERCIAL

## 2021-09-29 VITALS
TEMPERATURE: 98.7 F | WEIGHT: 105.82 LBS | SYSTOLIC BLOOD PRESSURE: 132 MMHG | RESPIRATION RATE: 16 BRPM | BODY MASS INDEX: 17.63 KG/M2 | HEIGHT: 65 IN | HEART RATE: 93 BPM | OXYGEN SATURATION: 99 % | DIASTOLIC BLOOD PRESSURE: 58 MMHG

## 2021-09-29 PROBLEM — R60.0 EDEMA OF BOTH LEGS: Status: ACTIVE | Noted: 2021-09-29

## 2021-09-29 PROBLEM — E87.6 HYPOKALEMIA: Status: ACTIVE | Noted: 2021-09-29

## 2021-09-29 PROBLEM — Z11.52 ENCOUNTER FOR SCREENING LABORATORY TESTING FOR SEVERE ACUTE RESPIRATORY SYNDROME CORONAVIRUS 2 (SARS-COV-2): Status: ACTIVE | Noted: 2021-09-29

## 2021-09-29 PROBLEM — D50.9 IRON DEFICIENCY ANEMIA, UNSPECIFIED IRON DEFICIENCY ANEMIA TYPE: Status: ACTIVE | Noted: 2021-09-29

## 2021-09-29 LAB
ANION GAP SERPL CALCULATED.3IONS-SCNC: 4 MMOL/L (ref 3–14)
BLD PROD TYP BPU: NORMAL
BLOOD COMPONENT TYPE: NORMAL
BUN SERPL-MCNC: 6 MG/DL (ref 7–30)
CALCIUM SERPL-MCNC: 7.8 MG/DL (ref 8.5–10.1)
CHLORIDE BLD-SCNC: 109 MMOL/L (ref 94–109)
CO2 SERPL-SCNC: 25 MMOL/L (ref 20–32)
CODING SYSTEM: NORMAL
CREAT SERPL-MCNC: 0.74 MG/DL (ref 0.52–1.04)
CROSSMATCH: NORMAL
ERYTHROCYTE [DISTWIDTH] IN BLOOD BY AUTOMATED COUNT: ABNORMAL %
GFR SERPL CREATININE-BSD FRML MDRD: 90 ML/MIN/1.73M2
GLUCOSE BLD-MCNC: 82 MG/DL (ref 70–99)
HCT VFR BLD AUTO: 27.9 % (ref 35–47)
HGB BLD-MCNC: 6.3 G/DL (ref 11.7–15.7)
HGB BLD-MCNC: 8.2 G/DL (ref 11.7–15.7)
HGB BLD-MCNC: 8.2 G/DL (ref 11.7–15.7)
LVEF ECHO: NORMAL
MCH RBC QN AUTO: 20.1 PG (ref 26.5–33)
MCHC RBC AUTO-ENTMCNC: 29.4 G/DL (ref 31.5–36.5)
MCV RBC AUTO: 68 FL (ref 78–100)
PLATELET # BLD AUTO: 354 10E3/UL (ref 150–450)
POTASSIUM BLD-SCNC: 3.7 MMOL/L (ref 3.4–5.3)
POTASSIUM BLD-SCNC: 3.7 MMOL/L (ref 3.4–5.3)
RBC # BLD AUTO: 4.08 10E6/UL (ref 3.8–5.2)
SODIUM SERPL-SCNC: 138 MMOL/L (ref 133–144)
UNIT ABO/RH: NORMAL
UNIT NUMBER: NORMAL
UNIT STATUS: NORMAL
UNIT TYPE ISBT: 5100
VIT B12 SERPL-MCNC: 413 PG/ML (ref 193–986)
WBC # BLD AUTO: 4.2 10E3/UL (ref 4–11)

## 2021-09-29 PROCEDURE — 99217 PR OBSERVATION CARE DISCHARGE: CPT | Performed by: INTERNAL MEDICINE

## 2021-09-29 PROCEDURE — P9016 RBC LEUKOCYTES REDUCED: HCPCS | Performed by: FAMILY MEDICINE

## 2021-09-29 PROCEDURE — 36415 COLL VENOUS BLD VENIPUNCTURE: CPT | Performed by: FAMILY MEDICINE

## 2021-09-29 PROCEDURE — 85018 HEMOGLOBIN: CPT | Performed by: INTERNAL MEDICINE

## 2021-09-29 PROCEDURE — 80048 BASIC METABOLIC PNL TOTAL CA: CPT | Performed by: INTERNAL MEDICINE

## 2021-09-29 PROCEDURE — 84132 ASSAY OF SERUM POTASSIUM: CPT | Performed by: INTERNAL MEDICINE

## 2021-09-29 PROCEDURE — 96376 TX/PRO/DX INJ SAME DRUG ADON: CPT | Mod: 59

## 2021-09-29 PROCEDURE — 93306 TTE W/DOPPLER COMPLETE: CPT

## 2021-09-29 PROCEDURE — 85018 HEMOGLOBIN: CPT | Performed by: FAMILY MEDICINE

## 2021-09-29 PROCEDURE — G0378 HOSPITAL OBSERVATION PER HR: HCPCS

## 2021-09-29 PROCEDURE — 250N000011 HC RX IP 250 OP 636: Performed by: INTERNAL MEDICINE

## 2021-09-29 PROCEDURE — 93306 TTE W/DOPPLER COMPLETE: CPT | Mod: 26 | Performed by: INTERNAL MEDICINE

## 2021-09-29 PROCEDURE — 36415 COLL VENOUS BLD VENIPUNCTURE: CPT | Performed by: INTERNAL MEDICINE

## 2021-09-29 PROCEDURE — C9113 INJ PANTOPRAZOLE SODIUM, VIA: HCPCS | Performed by: INTERNAL MEDICINE

## 2021-09-29 RX ORDER — FERROUS SULFATE 7.5 MG/0.5
5 SYRINGE (EA) ORAL DAILY
Qty: 500 ML | Refills: 0 | Status: SHIPPED | OUTPATIENT
Start: 2021-09-29

## 2021-09-29 RX ADMIN — PANTOPRAZOLE SODIUM 40 MG: 40 INJECTION, POWDER, FOR SOLUTION INTRAVENOUS at 07:53

## 2021-09-29 RX ADMIN — FUROSEMIDE 20 MG: 10 INJECTION, SOLUTION INTRAMUSCULAR; INTRAVENOUS at 06:05

## 2021-09-29 ASSESSMENT — ACTIVITIES OF DAILY LIVING (ADL)
ADLS_ACUITY_SCORE: 3

## 2021-09-29 NOTE — H&P
"[facility]    History and Physical - Hospitalist Service       Date of Admission:  9/28/2021    Assessment & Plan   Ayse Velez is a 56 year old female admitted on 9/28/2021. She is admitted with severe anemia.    Severe anemia  Patient presented emergency department with a hemoglobin of 2.6.  Four units have been ordered by the emergency room physician to be transfused.  Recheck hemoglobin following transfusion.  MCV is low at 56.  Ferritin and iron studies are pending.  B12 and folate levels also pending.  CT of the chest abdomen and pelvis with contrast demonstrates no lymphadenopathy or evidence of malignancy.  LDH is normal at 165.  Peripheral blood smear and reticulocyte count is pending.  We will consult general surgery for EGD and colonoscopy.    Cardiac enlargement  Cardiac enlargement is noted on chest CT.  Troponin is undetectable but BNP is elevated at 2364.  Will order echocardiogram.    Bilateral lower extremity edema  Likely due to a combination of CHF and low oncotic pressure from severe anemia.  We will try to gently diurese with IV Lasix 20mg every 8 hours.    Nicotine abuse  Patient's been advised to quit smoking.     Diet: Regular Diet Adult    DVT Prophylaxis: Pneumatic Compression Devices  Ledezma Catheter: Not present  Code Status:   Full    Disposition Plan   Patient be discharged to home once hemoglobin stable.    Entered: Leon Esteban MD 09/28/2021, 7:41 PM       Leon Esteban MD  [facility]    ______________________________________________________________________    Chief Complaint   Chief Complaint   Patient presents with     Leg Swelling     BLE edema x 2 weeks.  \"all the way up to my crotch\".  pt has not seen a medical provider \"in 30 years\"        History is obtained from the patient    History of Present Illness   Ayse Velez is a 56 year old female who presents to the emergency department after several weeks " of lower extremity edema.  Patient states that the edema has gradually worsened.  She denies orthopnea or paroxysmal nocturnal dyspnea.  She denies chest pain, pressure, or palpitations.  She denies lightheadedness or dizziness.  She denies aspirin or NSAID use.  She denies family history of anemia or malignancy.  She denies hematuria or tea colored urine.  She denies diarrhea, melena, or hematochezia.  She denies vomiting, hematemesis, or coffee-ground emesis.  The patient denies headache, nasal congestion, runny nose, sore throat, wheezing, cough, or shortness of breath.   Patient denies syncope, falls, or trauma.  Patient denies rash, muscle aches, joint pain.  Patient denies headache, neck pain, or back pain.  Patient denies diplopia, dysarthria, dysphagia, incoordination, focal numbness, or unilateral weakness.  The patient states that she has not seen a doctor in over 30 years and is not up-to-date on any of her recommended cancer screening.    Review of Systems    The 10 point Review of Systems is negative other than noted in the HPI or here.     Past Medical History    I have reviewed this patient's medical history and updated it with pertinent information if needed.   No past medical history on file.    Patient Active Problem List    Diagnosis Date Noted     Severe anemia 2021     Priority: Medium        Past Surgical History   I have reviewed this patient's surgical history and updated it with pertinent information if needed.  Past Surgical History:   Procedure Laterality Date     SURGICAL HISTORY OF -   3/19/1981    Transverse Low Cervical   (breech)       Social History   I have reviewed this patient's social history and updated it with pertinent information if needed.  Social History     Tobacco Use     Smoking status: Not on file   Substance Use Topics     Alcohol use: Not on file     Drug use: Not on file       Family History   Negative for malignancy  Father with pacemaker    No family  "history on file.    Prior to Admission Medications   Prior to Admission Medications   Prescriptions Last Dose Informant Patient Reported? Taking?   diphenhydrAMINE (BENADRYL) 25 MG tablet 9/27/2021 at am Self Yes Yes   Sig: Take 25 mg by mouth every morning      Facility-Administered Medications: None     Allergies   Allergies   Allergen Reactions     Erythromycin Nausea and Vomiting       Physical Exam   Vital Signs: Temp: 98.9  F (37.2  C) Temp src: Oral BP: 120/50 Pulse: 91   Resp: 16 SpO2: 100 % O2 Device: None (Room air)    Weight: 105 lbs 0 oz    Gen: Pale thin female, resting comfortably in bed, no acute distress  Head: atraumatic normocephalic; sclera non-injected, anicterric; pt declines oral exam due to \"bad teeth\"  Neck: supple without spinal abnormality  Chest: clear to auscultation bilaterally, no wheezes, no rhonchi, no rales.  Cardiovascular: regular rate and rhythm, no gallops or rubs, 2/6 WESLY, 3+ BLE edema to mid thigh  Abdomen: bowel sounds normal, no hepatosplenomegaly, no masses, non-tender, non-distended, no guarding, no rebound tenderness  Musculoskeletal: normal muscle mass, normal muscle tone  Skin: no rashes, no chronic venous stasis  Lymph: no lymphadenopathy.  Neuro: cranial nerves II-XII intact, strength in all four extremities normal, reflexes normal, coordination normal.    Data   Data reviewed today: I reviewed all medications, new labs and imaging results over the last 24 hours. I personally reviewed no images or EKG's today.    Recent Labs   Lab 09/28/21  1551   WBC 3.4*   HGB 2.6*   MCV 56*         POTASSIUM 3.3*   CHLORIDE 115*   CO2 21   BUN 6*   CR 0.71   ANIONGAP 6   MIGEL 7.7*   GLC 86   ALBUMIN 2.7*   PROTTOTAL 5.7*   BILITOTAL 0.4   ALKPHOS 70   ALT 13   AST 18   TROPONIN <0.015         Recent Results (from the past 24 hour(s))   XR Chest 2 Views    Narrative    XR CHEST 2 VW  9/28/2021 3:15 PM       INDICATION: leg edema  COMPARISON: None       Impression    " IMPRESSION: Cardiomegaly. Pulmonary vascularity is within normal  limits. There are small bilateral pleural effusions. Mild infiltrate  or atelectasis in the right lower lobe.    JAYLEN DENNEY MD         SYSTEM ID:  [id number]   CT Chest/Abdomen/Pelvis w Contrast    Narrative    EXAM: CT CHEST/ABDOMEN/PELVIS W CONTRAST  LOCATION: [facility]  DATE/TIME: 9/28/2021 5:26 PM    INDICATION: Severe anemia. Bilateral lower extremity edema. Suspected underlying malignancy.  COMPARISON: None.  TECHNIQUE: CT scan of the chest, abdomen, and pelvis was performed following injection of IV contrast. Multiplanar reformats were obtained. Dose reduction techniques were used.   CONTRAST: 52 mL Isovue 370    FINDINGS:   LUNGS AND PLEURA: There are bilateral pleural effusions, small to moderate on the right and small on the left. Mild associated atelectasis at both lung bases. Smooth interlobular septal thickening in both lower lungs suggests pulmonary edema. There are   mild emphysematous changes noted in both upper lungs.    MEDIASTINUM/AXILLAE: Borderline enlarged precarinal lymph node in the mediastinum measures 1.8 x 1 cm. No other enlarged lymph nodes are identified in the chest. No pericardial effusion. Cardiac enlargement.    CORONARY ARTERY CALCIFICATION: Mild.    HEPATOBILIARY: Normal.    PANCREAS: Normal.    SPLEEN: Normal.    ADRENAL GLANDS: Normal.    KIDNEYS/BLADDER: No significant mass, stone, or hydronephrosis.    BOWEL: Diverticulosis of the colon. No acute inflammatory change. No obstruction.     LYMPH NODES: No lymphadenopathy.    VASCULATURE: Mild atherosclerotic aortoiliac calcification.    PELVIC ORGANS: Small amount of free fluid in the pelvis.    MUSCULOSKELETAL: Mild subcutaneous edema about the abdomen and pelvis.      Impression    IMPRESSION:  1.  Bilateral pleural effusions, small to moderate on the right and small on the left.  2.  Smooth interlobular septal thickening in both  lower lungs is likely related to pulmonary edema.  3.  Mild subcutaneous edema is noted about the abdomen and pelvis.  4.  Small amount of nonspecific free fluid in the pelvis.  5.  Cardiac enlargement.  6.  Emphysema.

## 2021-09-29 NOTE — PROVIDER NOTIFICATION
Dr. Esteban at approximately 2200 w/ concern from pt's hemoglobin rising from 2.7 to 4.4 after 1 unit.     Dr Esteban advised to Transfuse an additional 2 units of blood and reassess hemoglobin following the 3rd unit administered.

## 2021-09-29 NOTE — PROGRESS NOTES
"[facility] [place]    Patient admitted to room 2306 at approximately 2045 via wheel chair from emergency room. Patient was accompanied by transport tech.     Verbal SBAR report received from ED RN prior to patient arrival.     Patient ambulated to bed with stand-by assist. Patient alert and oriented X 3. The patient is not having any pain.  . Admission vital signs: Blood pressure 126/55, pulse 94, temperature 98.6  F (37  C), temperature source Oral, resp. rate 17, height 1.651 m (5' 5\"), weight 48 kg (105 lb 13.1 oz), SpO2 100 %. Patient was oriented to plan of care, call light, bed controls, tv, telephone, bathroom and visiting hours.     Risk Assessment    The following safety risks were identified during admission: fall. Yellow risk band applied: YES.     Skin Initial Assessment    This writer admitted this patient and completed a full skin assessment and Toño score in the Adult PCS flowsheet. Appropriate interventions initiated as needed.     Secondary skin check completed by Lashay Avalos RN.         Education    Patient has a [place] to Observation order: No  Observation education completed and documented: No      Poppy Painter RN    "

## 2021-09-29 NOTE — PLAN OF CARE
Vitals stable; denies pain. Alert and oriented. Tolerating regular diet. Up independently without any dizziness ore lightheadedness. Hemoglobin stable. Echo completed today. Patient eager for discharge. Plan of care reviewed with patient.

## 2021-09-29 NOTE — PLAN OF CARE
Vitals stable. Denies pain. Reviewed discharge instructions with patient; verbalized understanding and agreement. IV removed. Given doctor's note. Patient waiting on ride.

## 2021-09-29 NOTE — DISCHARGE SUMMARY
[facility]  Discharge Summary  Hospital Medicine       Date of Admission:  9/28/2021  Date of Discharge:  9/29/2021  5:07 PM  Discharging Provider: Mahin Perez MD      Identification and Chief Compaint: Ayse Velez is a 56 year old female who presented on 9/28/2021 with complaint of increasing lower extremity edema.    Discharge Diagnoses   Severe iron-deficiency anemia  Gastroesophageal reflux disease   Edema due to severe anemia  Moderate diastolic dysfunction without heart failure   Tobacco use disorder     Follow-ups Needed After Discharge   Follow-up Appointments     Follow-up and recommended labs and tests       Follow up with primary care provider, [facility] within 7   days for hospital follow- up.  The following labs/tests are recommended:   Hemoglobin check when you see your PCP.  Upper and lower endoscopies.          Follow up blood pressure    Hospital Course        Ayse Velez is a 56 year old female admitted on 9/28/2021. She is admitted with severe anemia.    Severe iron-deficiency anemia  Gastroesophageal reflux disease     Patient presented emergency department with a hemoglobin of 2.6.  Four units have been ordered by the emergency room physician to be transfused.  Recheck hemoglobin following transfusion.  MCV is low at 56.  Ferritin is 1, iron 13, TIBC 452, iron sat 3%.  B12 and folate both normal.  Blood morphology consistent with iron deficiency anemia. CT of the chest abdomen and pelvis with contrast demonstrates no lymphadenopathy or evidence of malignancy.  LDH is normal at 165. No evidence of active bleeding.    After 4 units PRBC, hemoglobin is 8.2, and patient is feeling better. She wishes to discharge and have outpatient endoscopies, and this is reasonable given no active bleeding. She reports her menses ended 8 or more years ago but they had been very heavy. It's possible she has had iron deficiency for a long time. She does  report intermittent heartburn and reflux but no hematemesis or melena.   - start oral iron once daily, take with acidic juice (patient to use cranberry juice). She wants to use liquid if possible as she has some trouble swallowing pills if too large. Liquid script written but could use pill if needed   - omeprazole 40 mg daily at least until PUD or gastritis ruled out by EGD   - outpatient EGD and colonoscopy in couple of weeks, patient agreeable   - establish PCP for follow up, patient prefers [facility] clinic near her home. Follow hemoglobin and endoscopies. May need further work up if scopes negative.     Bilateral lower extremity edema    Cardiac enlargement is noted on chest CT.  Troponin is undetectable but BNP is elevated at 2364. Possible combination of CHF and low oncotic pressure from severe anemia.  Treated with IV Lasix 20mg every 8 hours and edema is improving.    Echo shows normal left ventricular size and function with ejection fraction 60 to 65%. There is grade 2 or moderate diastolic dysfunction.  There is mild 1+ aortic and tricuspid regurg.  Right ventricle is normal size and function.  IVC diameter is less than 2.1 cm collapsing greater than 50% with sniff suggest normal RA pressure of 3 mmHg.    Edema most likely related to the severe anemia.  Does not appear to have heart failure.  Blood pressure is not particularly elevated but should be followed as an outpatient given the diastolic dysfunction.  Will not continue diuretics on discharge but should follow outpatient this see if this improves as the anemia is improved.    Nicotine abuse  Patient's been advised to quit smoking.    Consultations This Hospital Stay   None    Code Status   Full Code    The discharge plan was discussed with the patient, and she expressed understanding.     Time Spent on this Encounter   Total time on this discharge was 60 minutes over two visits today.       Mahin Perez MD  [facility]  ______________________________________________________________________    Physical Exam   Vital Signs: Temp: 98.7  F (37.1  C) Temp src: Oral BP: 132/58 Pulse: 93   Resp: 16 SpO2: 99 % O2 Device: None (Room air)    Weight: 105 lbs 13.13 oz  Constitutional: alert and oriented, NAD, nontoxic  CV: Regular, 1-2+ bilateral lower extremity edema   Respiratory: CTA bilaterally  GI: Soft, non-tender   Skin: Warm and dry        Primary Care Physician   [facility]  [address] [id number]     Discharge Disposition   Discharged to home  Condition at discharge: Stable    Significant Results and Procedures   Results for orders placed or performed during the hospital encounter of 09/28/21   XR Chest 2 Views    Narrative    XR CHEST 2 VW  9/28/2021 3:15 PM       INDICATION: leg edema  COMPARISON: None       Impression    IMPRESSION: Cardiomegaly. Pulmonary vascularity is within normal  limits. There are small bilateral pleural effusions. Mild infiltrate  or atelectasis in the right lower lobe.    JAYLEN DENNEY MD         SYSTEM ID:  [id number]   CT Chest/Abdomen/Pelvis w Contrast    Narrative    EXAM: CT CHEST/ABDOMEN/PELVIS W CONTRAST  LOCATION: [facility]  DATE/TIME: 9/28/2021 5:26 PM    INDICATION: Severe anemia. Bilateral lower extremity edema. Suspected underlying malignancy.  COMPARISON: None.  TECHNIQUE: CT scan of the chest, abdomen, and pelvis was performed following injection of IV contrast. Multiplanar reformats were obtained. Dose reduction techniques were used.   CONTRAST: 52 mL Isovue 370    FINDINGS:   LUNGS AND PLEURA: There are bilateral pleural effusions, small to moderate on the right and small on the left. Mild associated atelectasis at both lung bases. Smooth interlobular septal thickening in both lower lungs suggests pulmonary edema. There are   mild emphysematous changes noted in both upper lungs.    MEDIASTINUM/AXILLAE: Borderline enlarged  precarinal lymph node in the mediastinum measures 1.8 x 1 cm. No other enlarged lymph nodes are identified in the chest. No pericardial effusion. Cardiac enlargement.    CORONARY ARTERY CALCIFICATION: Mild.    HEPATOBILIARY: Normal.    PANCREAS: Normal.    SPLEEN: Normal.    ADRENAL GLANDS: Normal.    KIDNEYS/BLADDER: No significant mass, stone, or hydronephrosis.    BOWEL: Diverticulosis of the colon. No acute inflammatory change. No obstruction.     LYMPH NODES: No lymphadenopathy.    VASCULATURE: Mild atherosclerotic aortoiliac calcification.    PELVIC ORGANS: Small amount of free fluid in the pelvis.    MUSCULOSKELETAL: Mild subcutaneous edema about the abdomen and pelvis.      Impression    IMPRESSION:  1.  Bilateral pleural effusions, small to moderate on the right and small on the left.  2.  Smooth interlobular septal thickening in both lower lungs is likely related to pulmonary edema.  3.  Mild subcutaneous edema is noted about the abdomen and pelvis.  4.  Small amount of nonspecific free fluid in the pelvis.  5.  Cardiac enlargement.  6.  Emphysema.   Echocardiogram Complete     Value    LVEF  60-65%    [facility]    [id number]  [id number]  [id number]  [id number]^DAYO^JAYLEN^MARIA GUADALUPE     [facility]  Echocardiography Laboratory  [address] [place] [address]     Name: MADELINE MARS  MRN: [id number]  : 1965  Study Date: 2021 10:41 AM  Age: 56 yrs  Gender: Female  Patient Location: [facility]  Reason For Study: CHF  Ordering Physician: JAYLEN OSHEA  Referring Physician: [facility]  Performed By: Tonja Farias RDCS     BSA: 1.5 m2  Height: 65 in  Weight: 105 lb  HR: 89  BP: 132/58 mmHg  ______________________________________________________________________________  Procedure  Complete Portable Echo Adult.  ______________________________________________________________________________  Interpretation Summary     1. Normal biventricular size and function. Left  ventricular ejection fraction  of 60-65%. No segmental wall motion abnormalities noted.  2. Grade II or moderate diastolic dysfunction. Diastolic Doppler findings  (E/E' ratio and/or other parameters) suggest left ventricular filling  pressures are increased.  3. There is mild (1+) aortic and tricuspid regurgitation..  4. Normal pulmonary artery pressure.  No prior study for comparison. Technically adequate study.     ______________________________________________________________________________  Left Ventricle  The left ventricle is normal in size. There is mild concentric left  ventricular hypertrophy. Left ventricular systolic function is normal. The  visual ejection fraction is 60-65%. Grade II or moderate diastolic  dysfunction. Diastolic Doppler findings (E/E' ratio and/or other parameters)  suggest left ventricular filling pressures are increased. Normal left  ventricular wall motion.     Right Ventricle  The right ventricle is normal in size and function.     Atria  The left atrium is borderline dilated. Right atrial size is normal. There is  no color Doppler evidence of an atrial shunt.     Mitral Valve  The mitral valve leaflets appear normal. There is no evidence of stenosis,  fluttering, or prolapse. There is trace mitral regurgitation.     Tricuspid Valve  Normal tricuspid valve. There is mild (1+) tricuspid regurgitation. The right  ventricular systolic pressure is approximated at 30.9 mmHg plus the right  atrial pressure.     Aortic Valve  The aortic valve is trileaflet. There is mild (1+) aortic regurgitation. No  aortic stenosis is present.     Pulmonic Valve  Normal pulmonic valve. There is trace pulmonic valvular regurgitation.     Vessels  The aortic root is normal size. Normal size ascending aorta. IVC diameter <2.1  cm collapsing >50% with sniff suggests a normal RA pressure of 3 mmHg.     Pericardium  Trivial pericardial effusion.     Rhythm  Sinus rhythm was  noted.  ______________________________________________________________________________  MMode/2D Measurements & Calculations  IVSd: 0.94 cm     LVIDd: 5.3 cm  LVIDs: 3.6 cm  LVPWd: 0.97 cm  FS: 33.3 %  LV mass(C)d: 191.3 grams  LV mass(C)dI: 127.2 grams/m2  Ao root diam: 3.2 cm  LA dimension: 3.2 cm  asc Aorta Diam: 3.4 cm  LA/Ao: 1.0  LA Volume (BP): 51.0 ml  LA Volume Index (BP): 34.0 ml/m2  RWT: 0.36     Doppler Measurements & Calculations  MV E max missael: 106.6 cm/sec  MV A max missael: 88.8 cm/sec  MV E/A: 1.2  MV dec time: 0.23 sec  AI P1/2t: 381.8 msec  PA acc time: 0.13 sec  TR max missael: 278.0 cm/sec  TR max P.9 mmHg  E/E' av.9  Lateral E/e': 14.9  Medial E/e': 18.9     ______________________________________________________________________________  Report approved by: Rik Boggs 2021 03:21 PM             Procedures    Transfusion of 4 units PRBC    Discharge Orders      Adult Gastro Ref - Procedure Only      Reason for your hospital stay    Severe iron deficiency anemia. The cause is not yet clear and this will need further work up with upper GI endoscopy and colonoscopy and follow up with a primary care provider.     Follow-up and recommended labs and tests     Follow up with primary care provider, [facility] within 7 days for hospital follow- up.  The following labs/tests are recommended: Hemoglobin check when you see your PCP.  Upper and lower endoscopies.     Activity    Your activity upon discharge: activity as tolerated     Diet    Follow this diet upon discharge:      Regular Diet Adult     Discharge Medications   Discharge Medication List as of 2021  4:43 PM      START taking these medications    Details   ferrous sulfate (XUAN-IN-SOL) 75 (15 FE) MG/ML oral drops Take 16 mLs (240 mg) by mouth daily, Disp-500 mL, R-0, E-PrescribePatient would like liquid but if expensive or not available, may substitute 324 mg tab daily      omeprazole (PRILOSEC) 20 MG DR capsule  Take 2 capsules (40 mg) by mouth daily, Disp-60 capsule, R-0, E-Prescribe         CONTINUE these medications which have NOT CHANGED    Details   diphenhydrAMINE (BENADRYL) 25 MG tablet Take 25 mg by mouth every morning, Historical           Allergies   Allergies   Allergen Reactions     Erythromycin Nausea and Vomiting

## 2021-09-29 NOTE — PROGRESS NOTES
[facility] Medicine Progress Note  Date of Service: 09/29/2021     Assessment & Plan           Ayse Velez is a 56 year old female admitted on 9/28/2021. She is admitted with severe anemia.    Severe iron-deficiency anemia  Gastroesophageal reflux disease     Patient presented emergency department with a hemoglobin of 2.6.  Four units have been ordered by the emergency room physician to be transfused.  Recheck hemoglobin following transfusion.  MCV is low at 56.  Ferritin is 1, iron 13, TIBC 452, iron sat 3%.  B12 and folate both normal.  Blood morphology consistent with iron deficiency anemia. CT of the chest abdomen and pelvis with contrast demonstrates no lymphadenopathy or evidence of malignancy.  LDH is normal at 165. No evidence of active bleeding.    After 4 units PRBC, hemoglobin is 8.2, and patient is feeling better. She wishes to discharge and have outpatient endoscopies, and this is reasonable given no active bleeding. She reports her menses ended 8 or more years ago but they had been very heavy. It's possible she has had iron deficiency for a long time. She does report intermittent heartburn and reflux but no hematemesis or melena.   - start oral iron once daily, take with acidic juice (patient to use cranberry juice). She wants to use liquid if possible as she has some trouble swallowing pills if too large. Liquid script written but could use pill if needed   - omeprazole 40 mg daily at least until PUD or gastritis ruled out by EGD   - outpatient EGD and colonoscopy in couple of weeks, patient agreeable   - establish PCP for follow up, patient prefers [facility] clinic near her home. Follow hemoglobin and endoscopies. May need further work up if scopes negative.     Cardiac enlargement      Bilateral lower extremity edema    Cardiac enlargement is noted on chest CT.  Troponin is undetectable but BNP is elevated at 2364. Possible combination of CHF and low  oncotic pressure from severe anemia.  Treated with IV Lasix 20mg every 8 hours and edema is improving.   - follow up echo today as may need further med management if there is cardiomyopathy    Nicotine abuse  Patient's been advised to quit smoking.       Diet: NPO per Anesthesia Guidelines for Procedure/Surgery Except for: Meds    DVT Prophylaxis: Low Risk/Ambulatory with no VTE prophylaxis indicated  Ledezma Catheter: Not present  Central Lines: None  Code Status: Full Code      Disposition Plan   Expected discharge: likely later today, pending echo result     The patient's care was discussed with the Patient.    Mahin Perez MD  Hospitalist Service  [facility]    Interval History   HPI reviewed with patient. Feels better. Edema improved. No chest pain or shortness of breath, no orthopnea or PND. Some reflux and heartburn at times but no hematemesis, melena or BRBPR. Last had menses over 8 years ago but they were very heavy before they stopped. No healthcare visits 30 years.   Still smokes cigarettes. Has one drink per day, usually beer or rum and coke, no methamphetamine or other illicit drug use.     Data reviewed today: I reviewed all medications, new labs and imaging results over the last 24 hours. I personally reviewed no images or EKG's today.    Physical Exam   Temp:  [98.2  F (36.8  C)-98.9  F (37.2  C)] 98.7  F (37.1  C)  Pulse:  [] 93  Resp:  [13-36] 16  BP: (118-151)/(50-71) 132/58  SpO2:  [99 %-100 %] 99 %  Constitutional: alert and oriented, NAD  CV: Regular, 2+ bilateral lower extremity edema, no JVD, PMI is normal  Respiratory: CTA bilaterally  GI: Soft, thin, non-tender, bowel sounds are normal  Skin: Warm and dry    Data   Recent Labs   Lab 09/29/21  0744 09/29/21  0200 09/28/21  2133 09/28/21  1551 09/28/21  1551   WBC 4.2  --  3.9*  --  3.4*   HGB 8.2*  8.2* 6.3* 4.4*   < > 2.6*   MCV 68*  --  63*  --  56*     --  392  --  391   NA  --   --   --    --  142   POTASSIUM  --  3.7  --   --  3.3*   CHLORIDE  --   --   --   --  115*   CO2  --   --   --   --  21   BUN  --   --   --   --  6*   CR  --   --   --   --  0.71   ANIONGAP  --   --   --   --  6   MIGEL  --   --   --   --  7.7*   GLC  --   --   --   --  86   ALBUMIN  --   --   --   --  2.7*   PROTTOTAL  --   --   --   --  5.7*   BILITOTAL  --   --   --   --  0.4   ALKPHOS  --   --   --   --  70   ALT  --   --   --   --  13   AST  --   --   --   --  18   TROPONIN  --   --   --   --  <0.015    < > = values in this interval not displayed.     Recent Results (from the past 24 hour(s))   XR Chest 2 Views    Narrative    XR CHEST 2 VW  9/28/2021 3:15 PM       INDICATION: leg edema  COMPARISON: None       Impression    IMPRESSION: Cardiomegaly. Pulmonary vascularity is within normal  limits. There are small bilateral pleural effusions. Mild infiltrate  or atelectasis in the right lower lobe.    JAYLEN DENNEY MD         SYSTEM ID:  [id number]   CT Chest/Abdomen/Pelvis w Contrast    Narrative    EXAM: CT CHEST/ABDOMEN/PELVIS W CONTRAST  LOCATION: [facility]  DATE/TIME: 9/28/2021 5:26 PM    INDICATION: Severe anemia. Bilateral lower extremity edema. Suspected underlying malignancy.  COMPARISON: None.  TECHNIQUE: CT scan of the chest, abdomen, and pelvis was performed following injection of IV contrast. Multiplanar reformats were obtained. Dose reduction techniques were used.   CONTRAST: 52 mL Isovue 370    FINDINGS:   LUNGS AND PLEURA: There are bilateral pleural effusions, small to moderate on the right and small on the left. Mild associated atelectasis at both lung bases. Smooth interlobular septal thickening in both lower lungs suggests pulmonary edema. There are   mild emphysematous changes noted in both upper lungs.    MEDIASTINUM/AXILLAE: Borderline enlarged precarinal lymph node in the mediastinum measures 1.8 x 1 cm. No other enlarged lymph nodes are identified in the chest. No  pericardial effusion. Cardiac enlargement.    CORONARY ARTERY CALCIFICATION: Mild.    HEPATOBILIARY: Normal.    PANCREAS: Normal.    SPLEEN: Normal.    ADRENAL GLANDS: Normal.    KIDNEYS/BLADDER: No significant mass, stone, or hydronephrosis.    BOWEL: Diverticulosis of the colon. No acute inflammatory change. No obstruction.     LYMPH NODES: No lymphadenopathy.    VASCULATURE: Mild atherosclerotic aortoiliac calcification.    PELVIC ORGANS: Small amount of free fluid in the pelvis.    MUSCULOSKELETAL: Mild subcutaneous edema about the abdomen and pelvis.      Impression    IMPRESSION:  1.  Bilateral pleural effusions, small to moderate on the right and small on the left.  2.  Smooth interlobular septal thickening in both lower lungs is likely related to pulmonary edema.  3.  Mild subcutaneous edema is noted about the abdomen and pelvis.  4.  Small amount of nonspecific free fluid in the pelvis.  5.  Cardiac enlargement.  6.  Emphysema.     Medications       furosemide  20 mg Intravenous Q8H     iopamidol (ISOVUE-370)  51 mL Intravenous Once     pantoprazole (PROTONIX) IV  40 mg Intravenous BID AC     sodium chloride (PF)  3 mL Intracatheter Q8H     sodium chloride 0.9 %  53 mL Intravenous Once

## 2021-09-29 NOTE — PLAN OF CARE
A&O X4. SBA. Last bowel movement 9/25. NPO since 0000. IV Lasix administered per MAR, edema decreased from 3+ to 2+. 3 units of blood transfused, awaiting hemoglobin results.

## 2021-09-29 NOTE — UTILIZATION REVIEW
"Concurrent stay review; Secondary Review Determination     Under the authority of the Utilization Management Committee, the utilization review process indicated a secondary review on the above patient.  The review outcome is based on review of the medical records, discussions with staff, and applying clinical experience noted on the date of the review.          (x) Observation Status Appropriate - Concurrent stay review    RATIONALE FOR DETERMINATION   55 yo with severe microcytic anemia, presented with hemoglobin 2.6. No obvious source so likely slow blood loss via GI tract. Has been transfused 4 units PRBCs with hemoglobin now 8.2. Per chart patient is \"eager for discharge\" today and likely to discharge home tonight due to stable hemoglobon. Can have outpatient EGD, colonoscopy.     Patient is clinically improving and there is no clear indication to change patient's status to inpatient. The severity of illness, intensity of service provided, expected LOS and risk for adverse outcome make the care appropriate for observation.    This document was produced using voice recognition software     The information on this document is developed by the utilization review team in order for the business office to ensure compliance.  This only denotes the appropriateness of proper admission status and does not reflect the quality of care rendered.         The definitions of Inpatient Status and Observation Status used in making the determination above are those provided in the CMS Coverage Manual, Chapter 1 and Chapter 6, section 70.4.      Sincerely,   Monserrat Guido MD  Utilization Review  Physician Advisor  [facility].  "

## 2021-09-29 NOTE — PROGRESS NOTES
Pt is A &O x4, has a steady gait and denies having any dizziness, she I now independent in her room. Verbally per Dr. Perez, pt is okay to eat now, she can have an outpatient EGD if needed. Possible discharge today. Per pt and previous RN the edema to the pt's BLE has decreased greatly, edema is now a +1 to +2 to her BLE.

## 2021-09-30 LAB — BACTERIA UR CULT: ABNORMAL

## 2021-10-06 ENCOUNTER — OFFICE VISIT (OUTPATIENT)
Dept: FAMILY MEDICINE | Facility: CLINIC | Age: 56
End: 2021-10-06
Payer: COMMERCIAL

## 2021-10-06 VITALS
OXYGEN SATURATION: 100 % | RESPIRATION RATE: 18 BRPM | BODY MASS INDEX: 16.83 KG/M2 | TEMPERATURE: 98.3 F | HEART RATE: 91 BPM | SYSTOLIC BLOOD PRESSURE: 138 MMHG | HEIGHT: 65 IN | WEIGHT: 101 LBS | DIASTOLIC BLOOD PRESSURE: 74 MMHG

## 2021-10-06 DIAGNOSIS — E87.6 HYPOKALEMIA: ICD-10-CM

## 2021-10-06 DIAGNOSIS — D50.0 IRON DEFICIENCY ANEMIA DUE TO CHRONIC BLOOD LOSS: Primary | ICD-10-CM

## 2021-10-06 DIAGNOSIS — Z13.220 SCREENING CHOLESTEROL LEVEL: ICD-10-CM

## 2021-10-06 DIAGNOSIS — Z87.891 HISTORY OF SMOKING 30 OR MORE PACK YEARS: ICD-10-CM

## 2021-10-06 DIAGNOSIS — Z76.89 ESTABLISHING CARE WITH NEW DOCTOR, ENCOUNTER FOR: ICD-10-CM

## 2021-10-06 DIAGNOSIS — Z91.89 AT RISK FOR OSTEOPOROSIS: ICD-10-CM

## 2021-10-06 DIAGNOSIS — I51.89 DIASTOLIC DYSFUNCTION: ICD-10-CM

## 2021-10-06 DIAGNOSIS — R03.0 ELEVATED BLOOD PRESSURE READING WITHOUT DIAGNOSIS OF HYPERTENSION: ICD-10-CM

## 2021-10-06 LAB
ANION GAP SERPL CALCULATED.3IONS-SCNC: 6 MMOL/L (ref 3–14)
BUN SERPL-MCNC: 17 MG/DL (ref 7–30)
CALCIUM SERPL-MCNC: 8.4 MG/DL (ref 8.5–10.1)
CHLORIDE BLD-SCNC: 108 MMOL/L (ref 94–109)
CHOLEST SERPL-MCNC: 92 MG/DL
CO2 SERPL-SCNC: 25 MMOL/L (ref 20–32)
CREAT SERPL-MCNC: 0.86 MG/DL (ref 0.52–1.04)
FASTING STATUS PATIENT QL REPORTED: NO
GFR SERPL CREATININE-BSD FRML MDRD: 75 ML/MIN/1.73M2
GLUCOSE BLD-MCNC: 89 MG/DL (ref 70–99)
HDLC SERPL-MCNC: 63 MG/DL
HGB BLD-MCNC: 8 G/DL (ref 11.7–15.7)
LDLC SERPL CALC-MCNC: 19 MG/DL
NONHDLC SERPL-MCNC: 29 MG/DL
POTASSIUM BLD-SCNC: 4.9 MMOL/L (ref 3.4–5.3)
SODIUM SERPL-SCNC: 139 MMOL/L (ref 133–144)
TRIGL SERPL-MCNC: 52 MG/DL

## 2021-10-06 PROCEDURE — 80061 LIPID PANEL: CPT | Performed by: FAMILY MEDICINE

## 2021-10-06 PROCEDURE — 99204 OFFICE O/P NEW MOD 45 MIN: CPT | Performed by: FAMILY MEDICINE

## 2021-10-06 PROCEDURE — 36415 COLL VENOUS BLD VENIPUNCTURE: CPT | Performed by: FAMILY MEDICINE

## 2021-10-06 PROCEDURE — 80048 BASIC METABOLIC PNL TOTAL CA: CPT | Performed by: FAMILY MEDICINE

## 2021-10-06 PROCEDURE — 85018 HEMOGLOBIN: CPT | Performed by: FAMILY MEDICINE

## 2021-10-06 ASSESSMENT — MIFFLIN-ST. JEOR: SCORE: 1044.01

## 2021-10-06 NOTE — PROGRESS NOTES
Assessment & Plan     Iron deficiency anemia due to chronic blood loss  - Hemoglobin  - Adult Gastro Ref - Procedure Only  - Hemoglobin    Hypokalemia  - Basic metabolic panel  (Ca, Cl, CO2, Creat, Gluc, K, Na, BUN)  - Basic metabolic panel  (Ca, Cl, CO2, Creat, Gluc, K, Na, BUN)    At risk for osteoporosis  - calcium carbonate-vitamin D (OS-MIGEL) 500-400 MG-UNIT tablet  Dispense: 90 tablet; Refill: 3    Screening cholesterol level  - Lipid panel reflex to direct LDL Non-fasting  - Lipid panel reflex to direct LDL Non-fasting     Diastolic Dysfunction  See pt instructions    Tobacco use disorder > 30 pack years smoking history   reports that she has been smoking cigarettes. She has never used smokeless tobacco.  Precontemplative    Elevated blood pressure reading without diagnosis of hypertension  Unconnected from care, dislikes interacting with medical system. May be white coat htn. Educated pt on multiple elevated Bps in chart and possible dx of HTN  Will just continue to f/u    Declined all her vaccinations today but I asked permission to bring it up again next time    Patient Instructions   - Recheck hemoglobin and basic metabolic profile today.  - I put the orders for the scopes from top and bottom but you don't have to schedule yet, I just want you to have them on hand.  - Your heart wasn't completely filling right (diastolic dysfunction) but mostly working well. We may want to repeat an echocardiogram in a few months.  - IF your blood level is OK today, we should recheck your blood level in 1 months as well as rechecking your iron level.  - IF you blood level is below 7 today I will be calling you and asking you to come back sooner.    Return in about 4 weeks (around 11/3/2021) for Follow up blood level.    Requests phone call for lab results. Both normal and abnormal.  Best to call [phone] - ok to leave detailed VM    Moraima Freedman MD  [facility]   Ayse  "is a 56 year old who presents for the following health issues  accompanied by her self:    HPI   Chief Complaint   Patient presents with     Hospital F/U     Health Maintenance     Patient declined all      Hospital Follow-up Visit:    Hospital/Nursing Home/IP Rehab Facility: [facility]  Date of Admission: 9-28-21  Date of Discharge: 9-29-21  Reason(s) for Admission: severe anemia, legs and feet swollen, patient is still having trouble with her ankles and feet still swollen          Was your hospitalization related to COVID-19? No   Problems taking medications regularly:  None  Medication changes since discharge: ferrous sulfate take 16mls (240mg)by mouth daily, Patient is taking 5 mls    Problems adhering to non-medication therapy:  Blood draw     Summary of hospitalization:  [facility] discharge summary reviewed  Diagnostic Tests/Treatments reviewed.  Follow up needed: blood levels and cardiac issues  Other Healthcare Providers Involved in Patient s Care:         None  Update since discharge: Overall feeling \"way better.\" Mentally feeling clearer. Much improved swelling - more just feet and ankles now whereas before it was up to hips. Reports that \"everyone comments that I'm not white as a ghost\"    Post Discharge Medication Reconciliation: discharge medications reconciled, continue medications without change.  Plan of care communicated with patient        Disconnected from care  No physician visits in decades (\"since my son was born\" - he's 28)  Works in [employer]. Combination of sitting and standing and walking around  Reports bad menopausal bleeding around 8 years ago lasting about a year    Reports around 1.5packs/week  Been smoking around 40 years - used to smoke up to a pack per day.  Has quit smoking in the past and succeeded by \"just doing it\"    Denies any hx of abnormal weight loss - reports she has been very slim her whole life.    Social " "History     Socioeconomic History     Marital status:      Spouse name: None     Number of children: None     Years of education: None     Highest education level: None   Occupational History     None   Tobacco Use     Smoking status: Current Every Day Smoker     Types: Cigarettes     Smokeless tobacco: Never Used   Vaping Use     Vaping Use: Never used   Substance and Sexual Activity     Alcohol use: Yes     Comment: 1 drink daily     Drug use: Never     Sexual activity: Yes     Partners: Male   Other Topics Concern     None   Social History Narrative     None     Review of Systems   Constitutional, HEENT, cardiovascular, pulmonary, gi and gu systems are negative, except as otherwise noted.      Objective    /74   Pulse 91   Temp 98.3  F (36.8  C) (Tympanic)   Resp 18   Ht 1.651 m (5' 5\")   Wt 45.8 kg (101 lb)   SpO2 100%   BMI 16.81 kg/m    Body mass index is 16.81 kg/m .  Physical Exam   GENERAL: appears cachectic but well. alert and no distress  RESP: lungs clear to auscultation - no rales, rhonchi or wheezes  CV: regular rate and rhythm, normal S1 S2, no S3 or S4, no murmur, click or rub, no peripheral edema and peripheral pulses strong  ABDOMEN: soft, nontender, no hepatosplenomegaly, no masses and bowel sounds normal  MS: no gross musculoskeletal defects noted. BLE pitting edema 2+ shelter up shins.            "

## 2021-10-06 NOTE — PATIENT INSTRUCTIONS
- Recheck hemoglobin and basic metabolic profile today.  - I put the orders for the scopes from top and bottom but you don't have to schedule yet, I just want you to have them on hand.  - Your heart wasn't completely filling right (diastolic dysfunction) but mostly working well. We may want to repeat an echocardiogram in a few months.  - IF your blood level is OK today, we should recheck your blood level in 1 months as well as rechecking your iron level.  - IF you blood level is below 7 today I will be calling you and asking you to come back sooner.

## 2021-10-19 ENCOUNTER — TELEPHONE (OUTPATIENT)
Dept: EMERGENCY MEDICINE | Facility: CLINIC | Age: 56
End: 2021-10-19

## 2021-10-19 NOTE — TELEPHONE ENCOUNTER
FYI    Patient has failed to return calls to schedule diagnostic upper and lower endoscopy procedures ordered

## 2021-11-07 ENCOUNTER — HEALTH MAINTENANCE LETTER (OUTPATIENT)
Age: 56
End: 2021-11-07

## 2021-11-12 ENCOUNTER — OFFICE VISIT (OUTPATIENT)
Dept: FAMILY MEDICINE | Facility: CLINIC | Age: 56
End: 2021-11-12
Payer: COMMERCIAL

## 2021-11-12 VITALS
OXYGEN SATURATION: 98 % | HEART RATE: 86 BPM | TEMPERATURE: 98.5 F | WEIGHT: 102.6 LBS | RESPIRATION RATE: 16 BRPM | BODY MASS INDEX: 17.09 KG/M2 | SYSTOLIC BLOOD PRESSURE: 136 MMHG | HEIGHT: 65 IN | DIASTOLIC BLOOD PRESSURE: 66 MMHG

## 2021-11-12 DIAGNOSIS — Z00.00 HEALTHCARE MAINTENANCE: ICD-10-CM

## 2021-11-12 DIAGNOSIS — R60.0 BILATERAL LOWER EXTREMITY EDEMA: ICD-10-CM

## 2021-11-12 DIAGNOSIS — Z28.21 COVID-19 VACCINATION DECLINED: ICD-10-CM

## 2021-11-12 DIAGNOSIS — Z23 NEED FOR VACCINATION: ICD-10-CM

## 2021-11-12 DIAGNOSIS — D50.0 IRON DEFICIENCY ANEMIA DUE TO CHRONIC BLOOD LOSS: Primary | ICD-10-CM

## 2021-11-12 LAB
FERRITIN SERPL-MCNC: 34 NG/ML (ref 8–252)
HGB BLD-MCNC: 10 G/DL (ref 11.7–15.7)

## 2021-11-12 PROCEDURE — 85018 HEMOGLOBIN: CPT | Performed by: FAMILY MEDICINE

## 2021-11-12 PROCEDURE — 99214 OFFICE O/P EST MOD 30 MIN: CPT | Performed by: FAMILY MEDICINE

## 2021-11-12 PROCEDURE — 82728 ASSAY OF FERRITIN: CPT | Performed by: FAMILY MEDICINE

## 2021-11-12 PROCEDURE — 36415 COLL VENOUS BLD VENIPUNCTURE: CPT | Performed by: FAMILY MEDICINE

## 2021-11-12 ASSESSMENT — MIFFLIN-ST. JEOR: SCORE: 1051.27

## 2021-11-12 NOTE — PROGRESS NOTES
"  Assessment & Plan     Iron deficiency anemia due to chronic blood loss  As long as it's improving, would likely be OK to recheck ~3months  We talked about whether a repeat echo in a few months might be indicated, at this point I think unlikely to change anything as long as her anemia improves  - Hemoglobin  - Ferritin  - Ferritin  - Hemoglobin    BLE edema - resolved  Encouraged to continue with improved nutrition    Need for vaccination  Covid vaccine decliend  Health maintenance  Patient declined all of the preventive health recommendations today, I did go through the list with her and she was not wanting to do any of it currently not discuss it so I postponed the recommended items.  I attempted to  on vaccination recommendations more specifically with particular focus on the covid-19 vaccine including harms/benefits and her added risk factors. I attempted to dispel misinformation but pt reports knowing that it's beneficial but not wanting it anyway  - REVIEW OF HEALTH MAINTENANCE PROTOCOL ORDERS    Return if symptoms worsen or fail to improve.    Moraima Freedman MD  [facility]   Ayse is a 56 year old who presents for the following health issues     HPI     Chief Complaint   Patient presents with     RECHECK     here to follow up on edema and recheck labs. edema has resolved.      Edema is gone  Cardiac symptoms have resolved  Declines all of the health maintenance    Tolerating the iron supplements no GI distress    Review of Systems   Constitutional, HEENT, cardiovascular, pulmonary, gi and gu systems are negative, except as otherwise noted.      Objective    /66   Pulse 86   Temp 98.5  F (36.9  C) (Tympanic)   Resp 16   Ht 1.651 m (5' 5\")   Wt 46.5 kg (102 lb 9.6 oz)   SpO2 98%   BMI 17.07 kg/m    Body mass index is 17.07 kg/m .  Physical Exam   GENERAL: healthy, alert and no distress  CV: RRR, normal S1, S2, no M/R/G.  trace edema of " LLE  RESP: normal respiratory effort, speaking in complete sentences  MS: no gross musculoskeletal defects noted  PSYCH: mentation appears normal, affect normal/bright

## 2022-04-13 ENCOUNTER — TELEPHONE (OUTPATIENT)
Dept: FAMILY MEDICINE | Facility: CLINIC | Age: 57
End: 2022-04-13
Payer: COMMERCIAL

## 2022-04-13 NOTE — TELEPHONE ENCOUNTER
Patient Quality Outreach    Patient is due for the following:   Cervical Cancer Screening - PAP Needed  Physical  - due    NEXT STEPS:   Schedule a yearly physical    Type of outreach:    Sent [employer] message.    Next Steps:  Reach out within 90 days via [employer].    Max number of attempts reached: No. Will try again in 90 days if patient still on fail list.    Questions for provider review:    None     Kyleigh Richardson, CMA

## 2022-08-24 ENCOUNTER — TELEPHONE (OUTPATIENT)
Dept: FAMILY MEDICINE | Facility: CLINIC | Age: 57
End: 2022-08-24

## 2022-08-24 NOTE — TELEPHONE ENCOUNTER
Patient Quality Outreach    Patient is due for the following:   Cervical Cancer Screening - PAP Needed    Next Steps:   Schedule a Adult Preventative    Type of outreach:    Sent [employer] message.    Next Steps:  Reach out within 90 days via [employer].    Max number of attempts reached: No. Will try again in 90 days if patient still on fail list.    Questions for provider review:    None     Kyleigh Richardson, CMA

## 2022-12-18 ENCOUNTER — HEALTH MAINTENANCE LETTER (OUTPATIENT)
Age: 57
End: 2022-12-18

## 2023-11-19 ENCOUNTER — HEALTH MAINTENANCE LETTER (OUTPATIENT)
Age: 58
End: 2023-11-19

## 2024-01-28 ENCOUNTER — HEALTH MAINTENANCE LETTER (OUTPATIENT)
Age: 59
End: 2024-01-28

## 2025-02-01 ENCOUNTER — HEALTH MAINTENANCE LETTER (OUTPATIENT)
Age: 60
End: 2025-02-01